# Patient Record
Sex: MALE | Race: OTHER | HISPANIC OR LATINO | Employment: UNEMPLOYED | URBAN - METROPOLITAN AREA
[De-identification: names, ages, dates, MRNs, and addresses within clinical notes are randomized per-mention and may not be internally consistent; named-entity substitution may affect disease eponyms.]

---

## 2020-11-12 ENCOUNTER — HOSPITAL ENCOUNTER (EMERGENCY)
Facility: MEDICAL CENTER | Age: 45
End: 2020-11-12
Attending: EMERGENCY MEDICINE
Payer: OTHER GOVERNMENT

## 2020-11-12 VITALS
SYSTOLIC BLOOD PRESSURE: 118 MMHG | WEIGHT: 229.72 LBS | HEART RATE: 72 BPM | OXYGEN SATURATION: 98 % | TEMPERATURE: 97.7 F | RESPIRATION RATE: 20 BRPM | DIASTOLIC BLOOD PRESSURE: 88 MMHG | BODY MASS INDEX: 36.06 KG/M2 | HEIGHT: 67 IN

## 2020-11-12 DIAGNOSIS — Z20.822 SUSPECTED 2019 NOVEL CORONAVIRUS INFECTION: ICD-10-CM

## 2020-11-12 LAB
COVID ORDER STATUS COVID19: NORMAL
SARS-COV-2 RNA RESP QL NAA+PROBE: DETECTED
SPECIMEN SOURCE: ABNORMAL

## 2020-11-12 PROCEDURE — 99283 EMERGENCY DEPT VISIT LOW MDM: CPT

## 2020-11-12 PROCEDURE — 700102 HCHG RX REV CODE 250 W/ 637 OVERRIDE(OP): Performed by: EMERGENCY MEDICINE

## 2020-11-12 PROCEDURE — A9270 NON-COVERED ITEM OR SERVICE: HCPCS | Performed by: EMERGENCY MEDICINE

## 2020-11-12 PROCEDURE — U0003 INFECTIOUS AGENT DETECTION BY NUCLEIC ACID (DNA OR RNA); SEVERE ACUTE RESPIRATORY SYNDROME CORONAVIRUS 2 (SARS-COV-2) (CORONAVIRUS DISEASE [COVID-19]), AMPLIFIED PROBE TECHNIQUE, MAKING USE OF HIGH THROUGHPUT TECHNOLOGIES AS DESCRIBED BY CMS-2020-01-R: HCPCS

## 2020-11-12 RX ORDER — ALBUTEROL SULFATE 90 UG/1
2 AEROSOL, METERED RESPIRATORY (INHALATION) EVERY 6 HOURS PRN
Qty: 8.5 G | Refills: 0 | Status: SHIPPED | OUTPATIENT
Start: 2020-11-12 | End: 2021-11-29

## 2020-11-12 RX ORDER — ACETAMINOPHEN 325 MG/1
650 TABLET ORAL ONCE
Status: COMPLETED | OUTPATIENT
Start: 2020-11-12 | End: 2020-11-12

## 2020-11-12 RX ORDER — BENZONATATE 100 MG/1
100 CAPSULE ORAL 3 TIMES DAILY PRN
Qty: 30 CAP | Refills: 0 | Status: SHIPPED | OUTPATIENT
Start: 2020-11-12 | End: 2021-11-29

## 2020-11-12 RX ORDER — IBUPROFEN 600 MG/1
600 TABLET ORAL ONCE
Status: COMPLETED | OUTPATIENT
Start: 2020-11-12 | End: 2020-11-12

## 2020-11-12 RX ADMIN — ACETAMINOPHEN 650 MG: 325 TABLET, FILM COATED ORAL at 11:24

## 2020-11-12 RX ADMIN — IBUPROFEN 600 MG: 600 TABLET, FILM COATED ORAL at 11:24

## 2020-11-12 SDOH — HEALTH STABILITY: MENTAL HEALTH: HOW OFTEN DO YOU HAVE A DRINK CONTAINING ALCOHOL?: NEVER

## 2020-11-12 NOTE — ED NOTES
Unable to complete med rec at this time.  Pt's phone number on file is to a group home [facility].

## 2020-11-12 NOTE — ED NOTES
assisting with care-med per erp, for d/c. instructions and prescriptions reviewed with pt. To use otc tylenol and/or motrin for discomfort or fever, return for worsening s/s, f/u with pcp. Aware of need to self isolate due to covid +   No

## 2020-11-12 NOTE — ED TRIAGE NOTES
Pt ambulates to triage    Chief Complaint   Patient presents with   • Body Aches   • Fatigue   • Headache     Pt A & 0 x 4, speech clear, ambulates well    COVID-19 screening criteria completed, pt denies high risk travel and denies contact with COVID-19 positive pt    Pt updated on triage process and asked to inform RN of any changes while waiting in lobby.
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
## 2020-11-12 NOTE — DISCHARGE INSTRUCTIONS
Please self isolate until the results of your coronavirus test are known  We will be working with the county to determine segregation based on symptoms and test results of your facility

## 2021-01-13 ENCOUNTER — HOSPITAL ENCOUNTER (EMERGENCY)
Facility: MEDICAL CENTER | Age: 46
End: 2021-01-13
Attending: EMERGENCY MEDICINE

## 2021-01-13 ENCOUNTER — APPOINTMENT (OUTPATIENT)
Dept: RADIOLOGY | Facility: MEDICAL CENTER | Age: 46
End: 2021-01-13
Attending: EMERGENCY MEDICINE

## 2021-01-13 VITALS
BODY MASS INDEX: 35.08 KG/M2 | WEIGHT: 231.48 LBS | HEART RATE: 93 BPM | DIASTOLIC BLOOD PRESSURE: 57 MMHG | TEMPERATURE: 96.6 F | HEIGHT: 68 IN | SYSTOLIC BLOOD PRESSURE: 110 MMHG | OXYGEN SATURATION: 94 % | RESPIRATION RATE: 45 BRPM

## 2021-01-13 DIAGNOSIS — I47.10 SVT (SUPRAVENTRICULAR TACHYCARDIA) (HCC): ICD-10-CM

## 2021-01-13 LAB
ALBUMIN SERPL BCP-MCNC: 4.1 G/DL (ref 3.2–4.9)
ALBUMIN/GLOB SERPL: 1.2 G/DL
ALP SERPL-CCNC: 100 U/L (ref 30–99)
ALT SERPL-CCNC: 36 U/L (ref 2–50)
ANION GAP SERPL CALC-SCNC: 15 MMOL/L (ref 7–16)
AST SERPL-CCNC: 31 U/L (ref 12–45)
BASOPHILS # BLD AUTO: 0.4 % (ref 0–1.8)
BASOPHILS # BLD: 0.05 K/UL (ref 0–0.12)
BILIRUB SERPL-MCNC: 0.2 MG/DL (ref 0.1–1.5)
BUN SERPL-MCNC: 23 MG/DL (ref 8–22)
CALCIUM SERPL-MCNC: 9.7 MG/DL (ref 8.4–10.2)
CHLORIDE SERPL-SCNC: 107 MMOL/L (ref 96–112)
CO2 SERPL-SCNC: 21 MMOL/L (ref 20–33)
CREAT SERPL-MCNC: 1.37 MG/DL (ref 0.5–1.4)
EKG IMPRESSION: NORMAL
EKG IMPRESSION: NORMAL
EOSINOPHIL # BLD AUTO: 0.19 K/UL (ref 0–0.51)
EOSINOPHIL NFR BLD: 1.6 % (ref 0–6.9)
ERYTHROCYTE [DISTWIDTH] IN BLOOD BY AUTOMATED COUNT: 47 FL (ref 35.9–50)
GLOBULIN SER CALC-MCNC: 3.4 G/DL (ref 1.9–3.5)
GLUCOSE SERPL-MCNC: 138 MG/DL (ref 65–99)
HCT VFR BLD AUTO: 42.1 % (ref 42–52)
HGB BLD-MCNC: 14.2 G/DL (ref 14–18)
IMM GRANULOCYTES # BLD AUTO: 0.07 K/UL (ref 0–0.11)
IMM GRANULOCYTES NFR BLD AUTO: 0.6 % (ref 0–0.9)
LYMPHOCYTES # BLD AUTO: 3.64 K/UL (ref 1–4.8)
LYMPHOCYTES NFR BLD: 30.3 % (ref 22–41)
MAGNESIUM SERPL-MCNC: 2.2 MG/DL (ref 1.5–2.5)
MCH RBC QN AUTO: 31.3 PG (ref 27–33)
MCHC RBC AUTO-ENTMCNC: 33.7 G/DL (ref 33.7–35.3)
MCV RBC AUTO: 92.7 FL (ref 81.4–97.8)
MONOCYTES # BLD AUTO: 0.58 K/UL (ref 0–0.85)
MONOCYTES NFR BLD AUTO: 4.8 % (ref 0–13.4)
NEUTROPHILS # BLD AUTO: 7.47 K/UL (ref 1.82–7.42)
NEUTROPHILS NFR BLD: 62.3 % (ref 44–72)
NRBC # BLD AUTO: 0 K/UL
NRBC BLD-RTO: 0 /100 WBC
PHOSPHATE SERPL-MCNC: 4.3 MG/DL (ref 2.5–4.5)
PLATELET # BLD AUTO: 410 K/UL (ref 164–446)
PMV BLD AUTO: 9.9 FL (ref 9–12.9)
POTASSIUM SERPL-SCNC: 3.9 MMOL/L (ref 3.6–5.5)
PROT SERPL-MCNC: 7.5 G/DL (ref 6–8.2)
RBC # BLD AUTO: 4.54 M/UL (ref 4.7–6.1)
SODIUM SERPL-SCNC: 143 MMOL/L (ref 135–145)
TSH SERPL DL<=0.005 MIU/L-ACNC: 4.86 UIU/ML (ref 0.38–5.33)
WBC # BLD AUTO: 12 K/UL (ref 4.8–10.8)

## 2021-01-13 PROCEDURE — 93005 ELECTROCARDIOGRAM TRACING: CPT | Performed by: EMERGENCY MEDICINE

## 2021-01-13 PROCEDURE — 700111 HCHG RX REV CODE 636 W/ 250 OVERRIDE (IP)

## 2021-01-13 PROCEDURE — 83735 ASSAY OF MAGNESIUM: CPT

## 2021-01-13 PROCEDURE — 84100 ASSAY OF PHOSPHORUS: CPT

## 2021-01-13 PROCEDURE — 85025 COMPLETE CBC W/AUTO DIFF WBC: CPT

## 2021-01-13 PROCEDURE — 96374 THER/PROPH/DIAG INJ IV PUSH: CPT

## 2021-01-13 PROCEDURE — 700111 HCHG RX REV CODE 636 W/ 250 OVERRIDE (IP): Performed by: EMERGENCY MEDICINE

## 2021-01-13 PROCEDURE — 99284 EMERGENCY DEPT VISIT MOD MDM: CPT

## 2021-01-13 PROCEDURE — 84443 ASSAY THYROID STIM HORMONE: CPT

## 2021-01-13 PROCEDURE — 94760 N-INVAS EAR/PLS OXIMETRY 1: CPT

## 2021-01-13 PROCEDURE — 71045 X-RAY EXAM CHEST 1 VIEW: CPT

## 2021-01-13 PROCEDURE — 80053 COMPREHEN METABOLIC PANEL: CPT

## 2021-01-13 RX ORDER — ADENOSINE 3 MG/ML
6 INJECTION, SOLUTION INTRAVENOUS ONCE
Status: COMPLETED | OUTPATIENT
Start: 2021-01-13 | End: 2021-01-13

## 2021-01-13 RX ORDER — ADENOSINE 3 MG/ML
INJECTION, SOLUTION INTRAVENOUS
Status: COMPLETED
Start: 2021-01-13 | End: 2021-01-13

## 2021-01-13 RX ORDER — ADENOSINE 3 MG/ML
12 INJECTION, SOLUTION INTRAVENOUS ONCE
Status: COMPLETED | OUTPATIENT
Start: 2021-01-13 | End: 2021-01-13

## 2021-01-13 RX ADMIN — ADENOSINE 12 MG: 3 INJECTION, SOLUTION INTRAVENOUS at 17:38

## 2021-01-13 RX ADMIN — ADENOSINE 6 MG: 3 INJECTION, SOLUTION INTRAVENOUS at 17:31

## 2021-01-14 NOTE — ED NOTES
Patient remains in sinus rhythm around 90. He denies any chest pain or sob. Discharged to home with instructions to follow up with cardiology and/or return for recurrence of SVT.
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
## 2021-01-14 NOTE — ED TRIAGE NOTES
"Chief Complaint   Patient presents with   • Shortness of Breath     started about an hour PTA, present w/ ambulation and rest.      /66   Pulse (!) 189   Temp 35.9 °C (96.6 °F) (Temporal)   Resp 20   Ht 1.727 m (5' 8\")   Wt 105 kg (231 lb 7.7 oz)   SpO2 96%   BMI 35.20 kg/m²     Pt arrived w/ above concern, pt had COVID-19 in early November, mask in place. EKG done in triage. Pt states he does have hx of anxiety but \"this feels different\"  "

## 2021-01-14 NOTE — DISCHARGE INSTRUCTIONS
Supraventricular Tachycardia, Adult  Supraventricular tachycardia (SVT) is a type of abnormal heart rhythm. It causes the heart to beat very quickly and then return to a normal speed.  A normal resting heart rate is  beats per minute. During an episode of SVT, your heart rate may be higher than 150 beats per minute. Episodes of SVT can be frightening, but they are usually not dangerous. However, if episodes happen several times per day or last longer than a few seconds, they may lead to heart failure.  What are the causes?    Usually, a normal heartbeat starts when an area called the sinoatrial node releases an electrical signal. In SVT, other areas of the heart send out electrical signals that interfere with the signal from the sinoatrial node. It is not known why some people get SVT and others do not.  What increases the risk?  You are more likely to develop this condition if you are:  · 12-30 years old.  · A woman.  The following factors may make you more likely to develop this condition:  · Stress.  · Tiredness.  · Smoking.  · Stimulant drugs, such as cocaine and methamphetamine.  · Alcohol.  · Caffeine.  · Pregnancy.  · Anxiety.  What are the signs or symptoms?  Symptoms of this condition include:  · A pounding heart.  · A feeling that the heart is skipping beats (palpitations).  · Weakness.  · Shortness of breath.  · Tightness or pain in your chest.  · Light-headedness.  · Anxiety.  · Dizziness.  · Sweating.  · Nausea.  · Fainting.  · Fatigue or tiredness.  A mild episode may not cause symptoms.  How is this diagnosed?  This condition may be diagnosed based on:  · Your symptoms.  · A physical exam.  ? If you have an episode of SVT during the exam, the health care provider may be able to diagnose SVT by listening to your heart and feeling your pulse.  · Tests. These may include:  ? An electrocardiogram (ECG). This test is done to check for problems with electrical activity in the heart.  ? A Holter  monitor or event monitor test. This test involves wearing a portable device that monitors your heart rate over time.  ? An echocardiogram. This test involves taking an image of your heart using sound waves. It is done to rule out other causes of a fast heart rate.  ? Blood tests.  How is this treated?  This condition may be treated with:  · Vagal nerve stimulation. The treatment involves stimulating your vagus nerve, which slows down the heart. It is often the first and only treatment that is needed for this condition. Work with your health care provider to find which one works best for you. Ways to do this treatment include:  ? Holding your breath and pushing, as though you are having a bowel movement.  ? Massaging an area on one side of your neck, below your jaw. Do not try this yourself. Only a health care provider should do this. If done the wrong way, it can lead to a stroke.  ? Bending forward with your head between your legs.  ? Coughing while bending forward with your head between your legs.  ? Closing your eyes and massaging your eyeballs. A health care provider should guide you through this method before you try it on your own.  · Medicines that prevent attacks.  · Medicine to stop an attack. The medicine is given through an IV at the hospital.  · A small electric shock (cardioversion) that stops an attack. Before you get the shock, you will get medicine to make you fall asleep.  · Radiofrequency ablation. In this procedure, a small, thin tube (catheter) is used to send radiofrequency energy to the area of tissue that is causing the rapid heartbeats. The energy kills the cells and helps your heart keep a normal rhythm. You may have this treatment if you have symptoms of SVT often.  If you do not have symptoms, you may not need treatment.  Follow these instructions at home:  Stress  · Avoid stressful situations when possible.  · Find healthy ways of managing stress that work for you. Some healthy ways to  manage stress include:  ? Taking part in relaxing activities, such as yoga, meditation, or being out in nature.  ? Listening to relaxing music.  ? Practicing relaxation techniques, such as deep breathing.  ? Leading a healthy lifestyle. This involves getting plenty of sleep, exercising, and eating a balanced diet.  ? Attending counseling or talk therapy with a mental health professional.  Lifestyle    · Try to get at least 7 hours of sleep each night.  · Do not use any products that contain nicotine or tobacco, such as cigarettes, e-cigarettes, and chewing tobacco. If you need help quitting, ask your health care provider.  · Be aware of how alcohol affects your condition. If alcohol:  ? Triggers episodes of SVT, do not drink alcohol.  ? Does not seem to trigger episodes, limit alcohol intake to no more than 1 drink a day for nonpregnant women and 2 drinks a day for men. Be aware of how much alcohol is in your drink. In the [place] one drink equals one 12 oz bottle of beer (355 mL), one 5 oz glass of wine (148 mL), or one 1½ oz glass of hard liquor (44 mL).  · Be aware of how caffeine affects your condition. If caffeine:  ? Triggers episodes of SVT, do not eat, drink, or use anything with caffeine in it.  ? Does not seem to trigger episodes, consume caffeine in moderation.  · Do not use stimulant drugs. If you need help quitting, talk with your health care provider.  General instructions  · Maintain a healthy weight.  · Exercise regularly. Ask your health care provider to suggest some good activities for you. Aim for one or a combination of the following:  ? 150 minutes per week of moderate exercise, such as walking or yoga.  ? 75 minutes per week of vigorous exercise, such as running or swimming.  · Perform vagus nerve stimulation as directed by your health care provider.  · Take over-the-counter and prescription medicines only as told by your health care provider.  · Keep all follow-up visits as told by your health  care provider. This is important.  Contact a health care provider if:  · You have episodes of SVT more often than before.  · Episodes of SVT last longer than before.  · Vagus nerve stimulation is no longer helping.  · You have new symptoms.  Get help right away if:  · You have chest pain.  · Your symptoms get worse.  · You have trouble breathing.  · You have an episode of SVT that lasts longer than 20 minutes.  · You faint.  These symptoms may represent a serious problem that is an emergency. Do not wait to see if the symptoms will go away. Get medical help right away. Call your local emergency services (911 in the [place] Do not drive yourself to the hospital.  Summary  · Supraventricular tachycardia (SVT) is a type of abnormal heart rhythm.  · During an episode of SVT, your heart rate may be higher than 150 beats per minute.  · Treatment depends on frequency of occurrence and symptoms experienced.  This information is not intended to replace advice given to you by your health care provider. Make sure you discuss any questions you have with your health care provider.  Document Released: 12/18/2006 Document Revised: 11/05/2019 Document Reviewed: 11/05/2019  Elsevier Patient Education © 2020 [employer]

## 2021-01-14 NOTE — ED NOTES
IV established with blood draw. Specimens to lab.  At bedside. Pt on defib pads, monitor in room, tried bearing down and coughing but no change in heart rate. Adenosine given 6mg with no change. 12mg adenosine given and patient converted to sinus rhythm, sinus tachycardia 80s - 105. Patient continues to deny any pain and states he is not sob since conversion.

## 2021-02-01 ENCOUNTER — HOSPITAL ENCOUNTER (EMERGENCY)
Facility: MEDICAL CENTER | Age: 46
End: 2021-02-01
Attending: EMERGENCY MEDICINE

## 2021-02-01 VITALS
WEIGHT: 242.95 LBS | TEMPERATURE: 98.2 F | HEIGHT: 68 IN | HEART RATE: 73 BPM | BODY MASS INDEX: 36.82 KG/M2 | SYSTOLIC BLOOD PRESSURE: 125 MMHG | DIASTOLIC BLOOD PRESSURE: 74 MMHG | OXYGEN SATURATION: 94 % | RESPIRATION RATE: 18 BRPM

## 2021-02-01 DIAGNOSIS — K04.7 PERIAPICAL ABSCESS: ICD-10-CM

## 2021-02-01 PROCEDURE — 700102 HCHG RX REV CODE 250 W/ 637 OVERRIDE(OP): Performed by: EMERGENCY MEDICINE

## 2021-02-01 PROCEDURE — A9270 NON-COVERED ITEM OR SERVICE: HCPCS | Performed by: EMERGENCY MEDICINE

## 2021-02-01 PROCEDURE — 99283 EMERGENCY DEPT VISIT LOW MDM: CPT

## 2021-02-01 PROCEDURE — A9270 NON-COVERED ITEM OR SERVICE: HCPCS

## 2021-02-01 PROCEDURE — 303977 HCHG I & D

## 2021-02-01 PROCEDURE — 700102 HCHG RX REV CODE 250 W/ 637 OVERRIDE(OP)

## 2021-02-01 RX ORDER — CLINDAMYCIN HYDROCHLORIDE 150 MG/1
300 CAPSULE ORAL ONCE
Status: COMPLETED | OUTPATIENT
Start: 2021-02-01 | End: 2021-02-01

## 2021-02-01 RX ORDER — CLINDAMYCIN HYDROCHLORIDE 300 MG/1
300 CAPSULE ORAL 3 TIMES DAILY
Qty: 30 CAP | Refills: 0 | Status: SHIPPED | OUTPATIENT
Start: 2021-02-01 | End: 2021-02-11

## 2021-02-01 RX ADMIN — CLINDAMYCIN HYDROCHLORIDE 300 MG: 150 CAPSULE ORAL at 16:50

## 2021-02-01 RX ADMIN — BENZOCAINE, BUTAMBEN, AND TETRACAINE HYDROCHLORIDE 1 SPRAY: .028; .004; .004 AEROSOL, SPRAY TOPICAL at 16:07

## 2021-02-01 ASSESSMENT — FIBROSIS 4 INDEX: FIB4 SCORE: 0.57

## 2021-02-01 NOTE — ED TRIAGE NOTES
Chief Complaint   Patient presents with   • Abscess     since yesterday he has an abscess on the roof of his mouth, he tried to pop it.   He reports now it feels like he has a hole in his mouth.

## 2021-02-02 NOTE — ED PROVIDER NOTES
"ED Provider Note    CHIEF COMPLAINT  Chief Complaint   Patient presents with   • Abscess     since yesterday he has an abscess on the roof of his mouth, he tried to pop it.       HPI  Bolivar Art is a 46 y.o. male who presents with tenderness to the roof of his mouth.  He states he noticed a suspected abscess in this region over the last couple of days.  He attempted to pop the abscess and had some blood that returned but no purulent drainage.  He has not had any associated fevers.  The patient does have some slight dental tenderness.  He has not had any facial swelling.    REVIEW OF SYSTEMS  No difficulty with breathing or swallowing.    PHYSICAL EXAM  VITAL SIGNS: /81   Pulse 87   Temp 37.2 °C (99 °F) (Temporal)   Resp 18   Ht 1.727 m (5' 8\")   Wt 110 kg (242 lb 15.2 oz)   SpO2 95%   BMI 36.94 kg/m²   In general the patient does not appear toxic    Facial exam no edema no erythema    Oral cavity the patient does have a periapical abscess to the medial aspect of the left upper molar    Neck is supple without adenopathy    PROCEDURES incision and drainage  Cetacaine spray was utilized for an anesthetic to the roof of her mouth.  I then used a #11 blade for incision and drainage and there is a very small amount of purulent drainage as well as some bleeding.  Medical cOURSE & MEDICAL DECISION MAKING  Pertinent Labs & Imaging studies reviewed. (See chart for details)  This a 46-year-old male who presents with a periapical abscess to the left upper incisor.  Incision and drainage was performed.  The patient will receive a dose of clindamycin as well as a prescription.  The patient does not have any insurance nor ability to follow-up and therefore have them rechecked at [facility] in 72 hours to make sure this is improving otherwise he may require oral surgical intervention.    FINAL IMPRESSION  1.  Left upper molar periapical abscess  2.  Incision and drainage   "     Disposition  The patient will be discharged in stable condition      Electronically signed by: Pasha Nichole M.D., 2/1/2021 4:02 PM

## 2021-02-02 NOTE — DISCHARGE INSTRUCTIONS
[facility] if [facility] are not significantly better in 72 hours and sooner if worse

## 2021-03-10 ENCOUNTER — HOSPITAL ENCOUNTER (EMERGENCY)
Facility: MEDICAL CENTER | Age: 46
End: 2021-03-10
Attending: EMERGENCY MEDICINE

## 2021-03-10 VITALS
BODY MASS INDEX: 37.89 KG/M2 | TEMPERATURE: 97 F | DIASTOLIC BLOOD PRESSURE: 96 MMHG | HEIGHT: 68 IN | WEIGHT: 250 LBS | RESPIRATION RATE: 14 BRPM | OXYGEN SATURATION: 94 % | HEART RATE: 86 BPM | SYSTOLIC BLOOD PRESSURE: 135 MMHG

## 2021-03-10 DIAGNOSIS — R21 RASH: ICD-10-CM

## 2021-03-10 PROCEDURE — 99281 EMR DPT VST MAYX REQ PHY/QHP: CPT

## 2021-03-10 RX ORDER — IBUPROFEN 200 MG
1 TABLET ORAL 2 TIMES DAILY
Qty: 3.5 G | Refills: 3 | Status: SHIPPED | OUTPATIENT
Start: 2021-03-10 | End: 2021-11-29

## 2021-03-10 RX ORDER — TRIAMCINOLONE ACETONIDE 1 MG/G
1 CREAM TOPICAL 2 TIMES DAILY
Qty: 15 G | Refills: 3 | Status: SHIPPED | OUTPATIENT
Start: 2021-03-10 | End: 2021-11-29

## 2021-03-10 ASSESSMENT — PAIN DESCRIPTION - PAIN TYPE: TYPE: ACUTE PAIN

## 2021-03-10 ASSESSMENT — FIBROSIS 4 INDEX: FIB4 SCORE: 0.57

## 2021-03-10 NOTE — ED TRIAGE NOTES
"Chief Complaint   Patient presents with   • Skin Lesion     cuts and dryness on hands x5 months       /96   Pulse 86   Temp 36.1 °C (97 °F) (Temporal)   Resp 14   Ht 1.727 m (5' 8\")   Wt 113 kg (250 lb)   SpO2 94%   BMI 38.01 kg/m²     "
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
## 2021-03-10 NOTE — ED NOTES
Patient given discharge instructions, paper prescriptions, and a work note. Patient has no further questions at this time.

## 2021-06-26 ENCOUNTER — HOSPITAL ENCOUNTER (EMERGENCY)
Facility: MEDICAL CENTER | Age: 46
End: 2021-06-26
Attending: EMERGENCY MEDICINE

## 2021-06-26 ENCOUNTER — APPOINTMENT (OUTPATIENT)
Dept: RADIOLOGY | Facility: MEDICAL CENTER | Age: 46
End: 2021-06-26
Attending: EMERGENCY MEDICINE

## 2021-06-26 VITALS
RESPIRATION RATE: 16 BRPM | DIASTOLIC BLOOD PRESSURE: 86 MMHG | OXYGEN SATURATION: 93 % | HEIGHT: 68 IN | SYSTOLIC BLOOD PRESSURE: 134 MMHG | BODY MASS INDEX: 38.06 KG/M2 | TEMPERATURE: 98.1 F | HEART RATE: 70 BPM | WEIGHT: 251.1 LBS

## 2021-06-26 DIAGNOSIS — L03.818 CELLULITIS OF OTHER SPECIFIED SITE: ICD-10-CM

## 2021-06-26 PROCEDURE — 71045 X-RAY EXAM CHEST 1 VIEW: CPT

## 2021-06-26 PROCEDURE — 99283 EMERGENCY DEPT VISIT LOW MDM: CPT

## 2021-06-26 PROCEDURE — 700102 HCHG RX REV CODE 250 W/ 637 OVERRIDE(OP): Performed by: EMERGENCY MEDICINE

## 2021-06-26 PROCEDURE — A9270 NON-COVERED ITEM OR SERVICE: HCPCS | Performed by: EMERGENCY MEDICINE

## 2021-06-26 RX ORDER — AMOXICILLIN 500 MG/1
500 CAPSULE ORAL 3 TIMES DAILY
Qty: 21 CAPSULE | Refills: 0 | Status: SHIPPED | OUTPATIENT
Start: 2021-06-26 | End: 2021-07-03

## 2021-06-26 RX ORDER — AMOXICILLIN 250 MG/1
500 CAPSULE ORAL ONCE
Status: COMPLETED | OUTPATIENT
Start: 2021-06-26 | End: 2021-06-26

## 2021-06-26 RX ORDER — SULFAMETHOXAZOLE AND TRIMETHOPRIM 800; 160 MG/1; MG/1
2 TABLET ORAL ONCE
Status: COMPLETED | OUTPATIENT
Start: 2021-06-26 | End: 2021-06-26

## 2021-06-26 RX ORDER — SULFAMETHOXAZOLE AND TRIMETHOPRIM 800; 160 MG/1; MG/1
2 TABLET ORAL 2 TIMES DAILY
Qty: 28 TABLET | Refills: 0 | Status: SHIPPED | OUTPATIENT
Start: 2021-06-26 | End: 2021-07-03

## 2021-06-26 RX ADMIN — SULFAMETHOXAZOLE AND TRIMETHOPRIM 2 TABLET: 800; 160 TABLET ORAL at 07:28

## 2021-06-26 RX ADMIN — AMOXICILLIN 500 MG: 250 CAPSULE ORAL at 07:38

## 2021-06-26 ASSESSMENT — FIBROSIS 4 INDEX: FIB4 SCORE: 0.57
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
## 2021-06-26 NOTE — DISCHARGE INSTRUCTIONS
You do have a possible/probable cellulitis in the scrotum.  This point you do not have an abscess requiring surgical intervention.  Please take the antibiotics as prescribed.  In addition, utilize a warm soaks on the area and if the lesion turns into a pimple-like structure go ahead and pop it.  Please return to the emergency department if increasing symptoms.

## 2021-06-26 NOTE — ED NOTES
Upon walking with pt to room, noted that pt appeared to be short of breath, and breathing with use of accessory muscles. Pt stated he felt mildly sob while ambulating; able to talk in full sentences. Placed pt in gown and on monitor. Noted that SAO2 was 84% on room air. Placed pt on 2L nc at this time and SAO2 94%. MD and RN notified and aware at this time.
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
## 2021-11-29 ENCOUNTER — HOSPITAL ENCOUNTER (EMERGENCY)
Facility: MEDICAL CENTER | Age: 46
End: 2021-11-29
Attending: EMERGENCY MEDICINE
Payer: MEDICAID

## 2021-11-29 ENCOUNTER — APPOINTMENT (OUTPATIENT)
Dept: RADIOLOGY | Facility: MEDICAL CENTER | Age: 46
End: 2021-11-29
Attending: EMERGENCY MEDICINE
Payer: MEDICAID

## 2021-11-29 VITALS
HEART RATE: 73 BPM | HEIGHT: 68 IN | BODY MASS INDEX: 41.57 KG/M2 | TEMPERATURE: 98.4 F | WEIGHT: 274.25 LBS | RESPIRATION RATE: 18 BRPM | OXYGEN SATURATION: 96 % | SYSTOLIC BLOOD PRESSURE: 109 MMHG | DIASTOLIC BLOOD PRESSURE: 59 MMHG

## 2021-11-29 DIAGNOSIS — R10.9 LEFT FLANK PAIN: ICD-10-CM

## 2021-11-29 DIAGNOSIS — S30.1XXA CONTUSION OF ABDOMINAL WALL, INITIAL ENCOUNTER: ICD-10-CM

## 2021-11-29 LAB
ALBUMIN SERPL BCP-MCNC: 4.3 G/DL (ref 3.2–4.9)
ALBUMIN/GLOB SERPL: 1.3 G/DL
ALP SERPL-CCNC: 97 U/L (ref 30–99)
ALT SERPL-CCNC: 50 U/L (ref 2–50)
ANION GAP SERPL CALC-SCNC: 13 MMOL/L (ref 7–16)
APPEARANCE UR: CLEAR
AST SERPL-CCNC: 29 U/L (ref 12–45)
BASOPHILS # BLD AUTO: 0.4 % (ref 0–1.8)
BASOPHILS # BLD: 0.04 K/UL (ref 0–0.12)
BILIRUB SERPL-MCNC: 0.3 MG/DL (ref 0.1–1.5)
BILIRUB UR QL STRIP.AUTO: NEGATIVE
BUN SERPL-MCNC: 18 MG/DL (ref 8–22)
CALCIUM SERPL-MCNC: 9.2 MG/DL (ref 8.4–10.2)
CHLORIDE SERPL-SCNC: 104 MMOL/L (ref 96–112)
CO2 SERPL-SCNC: 24 MMOL/L (ref 20–33)
COLOR UR: YELLOW
CREAT SERPL-MCNC: 0.99 MG/DL (ref 0.5–1.4)
EOSINOPHIL # BLD AUTO: 0.44 K/UL (ref 0–0.51)
EOSINOPHIL NFR BLD: 4.9 % (ref 0–6.9)
ERYTHROCYTE [DISTWIDTH] IN BLOOD BY AUTOMATED COUNT: 49.1 FL (ref 35.9–50)
GLOBULIN SER CALC-MCNC: 3.2 G/DL (ref 1.9–3.5)
GLUCOSE SERPL-MCNC: 100 MG/DL (ref 65–99)
GLUCOSE UR STRIP.AUTO-MCNC: NEGATIVE MG/DL
HCT VFR BLD AUTO: 45.1 % (ref 42–52)
HGB BLD-MCNC: 14.8 G/DL (ref 14–18)
IMM GRANULOCYTES # BLD AUTO: 0.04 K/UL (ref 0–0.11)
IMM GRANULOCYTES NFR BLD AUTO: 0.4 % (ref 0–0.9)
KETONES UR STRIP.AUTO-MCNC: NEGATIVE MG/DL
LEUKOCYTE ESTERASE UR QL STRIP.AUTO: NEGATIVE
LIPASE SERPL-CCNC: 38 U/L (ref 7–58)
LYMPHOCYTES # BLD AUTO: 2.79 K/UL (ref 1–4.8)
LYMPHOCYTES NFR BLD: 31 % (ref 22–41)
MCH RBC QN AUTO: 31.3 PG (ref 27–33)
MCHC RBC AUTO-ENTMCNC: 32.8 G/DL (ref 33.7–35.3)
MCV RBC AUTO: 95.3 FL (ref 81.4–97.8)
MICRO URNS: NORMAL
MONOCYTES # BLD AUTO: 0.69 K/UL (ref 0–0.85)
MONOCYTES NFR BLD AUTO: 7.7 % (ref 0–13.4)
NEUTROPHILS # BLD AUTO: 5 K/UL (ref 1.82–7.42)
NEUTROPHILS NFR BLD: 55.6 % (ref 44–72)
NITRITE UR QL STRIP.AUTO: NEGATIVE
NRBC # BLD AUTO: 0 K/UL
NRBC BLD-RTO: 0 /100 WBC
PH UR STRIP.AUTO: 6 [PH] (ref 5–8)
PLATELET # BLD AUTO: 345 K/UL (ref 164–446)
PMV BLD AUTO: 10 FL (ref 9–12.9)
POTASSIUM SERPL-SCNC: 4 MMOL/L (ref 3.6–5.5)
PROT SERPL-MCNC: 7.5 G/DL (ref 6–8.2)
PROT UR QL STRIP: NEGATIVE MG/DL
RBC # BLD AUTO: 4.73 M/UL (ref 4.7–6.1)
RBC UR QL AUTO: NEGATIVE
SODIUM SERPL-SCNC: 141 MMOL/L (ref 135–145)
SP GR UR STRIP.AUTO: 1.02
WBC # BLD AUTO: 9 K/UL (ref 4.8–10.8)

## 2021-11-29 PROCEDURE — 700111 HCHG RX REV CODE 636 W/ 250 OVERRIDE (IP): Performed by: EMERGENCY MEDICINE

## 2021-11-29 PROCEDURE — 80053 COMPREHEN METABOLIC PANEL: CPT

## 2021-11-29 PROCEDURE — 74176 CT ABD & PELVIS W/O CONTRAST: CPT

## 2021-11-29 PROCEDURE — 99284 EMERGENCY DEPT VISIT MOD MDM: CPT

## 2021-11-29 PROCEDURE — 83690 ASSAY OF LIPASE: CPT

## 2021-11-29 PROCEDURE — 96375 TX/PRO/DX INJ NEW DRUG ADDON: CPT

## 2021-11-29 PROCEDURE — 36415 COLL VENOUS BLD VENIPUNCTURE: CPT

## 2021-11-29 PROCEDURE — 85025 COMPLETE CBC W/AUTO DIFF WBC: CPT

## 2021-11-29 PROCEDURE — 96374 THER/PROPH/DIAG INJ IV PUSH: CPT

## 2021-11-29 PROCEDURE — 71045 X-RAY EXAM CHEST 1 VIEW: CPT

## 2021-11-29 PROCEDURE — 81003 URINALYSIS AUTO W/O SCOPE: CPT

## 2021-11-29 RX ORDER — ONDANSETRON 2 MG/ML
4 INJECTION INTRAMUSCULAR; INTRAVENOUS ONCE
Status: COMPLETED | OUTPATIENT
Start: 2021-11-29 | End: 2021-11-29

## 2021-11-29 RX ORDER — KETOROLAC TROMETHAMINE 30 MG/ML
15 INJECTION, SOLUTION INTRAMUSCULAR; INTRAVENOUS ONCE
Status: COMPLETED | OUTPATIENT
Start: 2021-11-29 | End: 2021-11-29

## 2021-11-29 RX ORDER — NAPROXEN 500 MG/1
500 TABLET ORAL 2 TIMES DAILY WITH MEALS
Qty: 10 TABLET | Refills: 0 | Status: SHIPPED | OUTPATIENT
Start: 2021-11-29 | End: 2021-12-04

## 2021-11-29 RX ADMIN — ONDANSETRON 4 MG: 2 INJECTION INTRAMUSCULAR; INTRAVENOUS at 20:58

## 2021-11-29 RX ADMIN — KETOROLAC TROMETHAMINE 15 MG: 30 INJECTION, SOLUTION INTRAMUSCULAR at 20:58

## 2021-11-29 ASSESSMENT — PAIN DESCRIPTION - PAIN TYPE
TYPE: ACUTE PAIN
TYPE: ACUTE PAIN

## 2021-11-29 ASSESSMENT — FIBROSIS 4 INDEX: FIB4 SCORE: 0.57
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
## 2021-11-30 NOTE — ED NOTES
This RN communicated AIDET with patient & visitor. Assessment complete. Patient updated on plan of care; awaiting MD evaluation.    Safety precautions are in place including gurney locked and in lowest position, bilateral rails up, call light within reach. Patient encouraged to use call light to notify staff for any needs.

## 2021-11-30 NOTE — ED TRIAGE NOTES
"46 yr old male to triage  Chief Complaint   Patient presents with   • Flank Pain     patient report of left flank pain after coughing and eating tonight approximately an hour ago Denies any dysuria or frequency.      /89   Pulse 87   Temp 36.8 °C (98.3 °F) (Temporal)   Resp 16   Ht 1.727 m (5' 8\")   Wt 124 kg (274 lb 4 oz)   SpO2 97%   BMI 41.70 kg/m²     Has this patient been vaccinated for COVID No   If not, would they like to be vaccinated while in the ER if eligible?  No   Would the patient like to speak with the ERP about the possibility of receiving the COVID vaccine today before making a decision? No     "

## 2021-11-30 NOTE — ED NOTES
PIV established, patient medicated for 10/10 L flank pain & nausea. Patient updated on plan of care. Awaiting CT scan.

## 2021-11-30 NOTE — ED NOTES
Discharge instructions reviewed with patient. AVS signed by patient. PIV removed. Prescription electronically sent to pharmacy of choice. Patient understands to return to ED for worsening symptoms. All questions answered at this time. Patient ambulated to exit with belongings & visitor. Patient in stable condition with no signs of distress. Patient agreeable to discharge instructions.

## 2023-05-20 ENCOUNTER — APPOINTMENT (OUTPATIENT)
Dept: RADIOLOGY | Facility: MEDICAL CENTER | Age: 48
DRG: 194 | End: 2023-05-20
Attending: EMERGENCY MEDICINE
Payer: COMMERCIAL

## 2023-05-20 ENCOUNTER — HOSPITAL ENCOUNTER (INPATIENT)
Facility: MEDICAL CENTER | Age: 48
LOS: 1 days | DRG: 194 | End: 2023-05-21
Attending: EMERGENCY MEDICINE | Admitting: HOSPITALIST
Payer: COMMERCIAL

## 2023-05-20 ENCOUNTER — APPOINTMENT (OUTPATIENT)
Dept: RADIOLOGY | Facility: MEDICAL CENTER | Age: 48
DRG: 194 | End: 2023-05-20
Attending: HOSPITALIST
Payer: COMMERCIAL

## 2023-05-20 DIAGNOSIS — S01.512A LACERATION OF TONGUE, INITIAL ENCOUNTER: ICD-10-CM

## 2023-05-20 DIAGNOSIS — R93.1 ABNORMAL ECHOCARDIOGRAM: ICD-10-CM

## 2023-05-20 DIAGNOSIS — J10.1 INFLUENZA B: ICD-10-CM

## 2023-05-20 DIAGNOSIS — E66.01 CLASS 3 SEVERE OBESITY WITH BODY MASS INDEX (BMI) OF 45.0 TO 49.9 IN ADULT, UNSPECIFIED OBESITY TYPE, UNSPECIFIED WHETHER SERIOUS COMORBIDITY PRESENT (HCC): ICD-10-CM

## 2023-05-20 PROBLEM — R09.02 HYPOXIA: Status: ACTIVE | Noted: 2023-05-20

## 2023-05-20 PROBLEM — D72.819 LEUKOPENIA: Status: ACTIVE | Noted: 2023-05-20

## 2023-05-20 LAB
ALBUMIN SERPL BCP-MCNC: 3.9 G/DL (ref 3.2–4.9)
ALBUMIN/GLOB SERPL: 1.3 G/DL
ALP SERPL-CCNC: 83 U/L (ref 30–99)
ALT SERPL-CCNC: 45 U/L (ref 2–50)
ANION GAP SERPL CALC-SCNC: 13 MMOL/L (ref 7–16)
AST SERPL-CCNC: 39 U/L (ref 12–45)
BASOPHILS # BLD AUTO: 0.5 % (ref 0–1.8)
BASOPHILS # BLD: 0.02 K/UL (ref 0–0.12)
BILIRUB SERPL-MCNC: 0.2 MG/DL (ref 0.1–1.5)
BLOOD CULTURE HOLD CXBCH: NORMAL
BLOOD CULTURE HOLD CXBCH: NORMAL
BUN SERPL-MCNC: 16 MG/DL (ref 8–22)
CALCIUM ALBUM COR SERPL-MCNC: 8.8 MG/DL (ref 8.5–10.5)
CALCIUM SERPL-MCNC: 8.7 MG/DL (ref 8.4–10.2)
CHLORIDE SERPL-SCNC: 103 MMOL/L (ref 96–112)
CO2 SERPL-SCNC: 20 MMOL/L (ref 20–33)
CREAT SERPL-MCNC: 0.99 MG/DL (ref 0.5–1.4)
EKG IMPRESSION: NORMAL
EOSINOPHIL # BLD AUTO: 0.07 K/UL (ref 0–0.51)
EOSINOPHIL NFR BLD: 1.8 % (ref 0–6.9)
ERYTHROCYTE [DISTWIDTH] IN BLOOD BY AUTOMATED COUNT: 51.2 FL (ref 35.9–50)
FLUAV RNA SPEC QL NAA+PROBE: NEGATIVE
FLUBV RNA SPEC QL NAA+PROBE: POSITIVE
GFR SERPLBLD CREATININE-BSD FMLA CKD-EPI: 94 ML/MIN/1.73 M 2
GLOBULIN SER CALC-MCNC: 3.1 G/DL (ref 1.9–3.5)
GLUCOSE SERPL-MCNC: 105 MG/DL (ref 65–99)
HCT VFR BLD AUTO: 46.9 % (ref 42–52)
HGB BLD-MCNC: 15.5 G/DL (ref 14–18)
IMM GRANULOCYTES # BLD AUTO: 0.02 K/UL (ref 0–0.11)
IMM GRANULOCYTES NFR BLD AUTO: 0.5 % (ref 0–0.9)
LACTATE SERPL-SCNC: 0.9 MMOL/L (ref 0.5–2)
LYMPHOCYTES # BLD AUTO: 1.83 K/UL (ref 1–4.8)
LYMPHOCYTES NFR BLD: 45.8 % (ref 22–41)
MCH RBC QN AUTO: 31.4 PG (ref 27–33)
MCHC RBC AUTO-ENTMCNC: 33 G/DL (ref 33.7–35.3)
MCV RBC AUTO: 94.9 FL (ref 81.4–97.8)
MONOCYTES # BLD AUTO: 0.6 K/UL (ref 0–0.85)
MONOCYTES NFR BLD AUTO: 15 % (ref 0–13.4)
NEUTROPHILS # BLD AUTO: 1.46 K/UL (ref 1.82–7.42)
NEUTROPHILS NFR BLD: 36.4 % (ref 44–72)
NRBC # BLD AUTO: 0 K/UL
NRBC BLD-RTO: 0 /100 WBC
PLATELET # BLD AUTO: 252 K/UL (ref 164–446)
PMV BLD AUTO: 9.9 FL (ref 9–12.9)
POTASSIUM SERPL-SCNC: 3.8 MMOL/L (ref 3.6–5.5)
PROT SERPL-MCNC: 7 G/DL (ref 6–8.2)
RBC # BLD AUTO: 4.94 M/UL (ref 4.7–6.1)
RSV RNA SPEC QL NAA+PROBE: NEGATIVE
SARS-COV-2 RNA RESP QL NAA+PROBE: NOTDETECTED
SODIUM SERPL-SCNC: 136 MMOL/L (ref 135–145)
SPECIMEN SOURCE: ABNORMAL
WBC # BLD AUTO: 4 K/UL (ref 4.8–10.8)

## 2023-05-20 PROCEDURE — 770001 HCHG ROOM/CARE - MED/SURG/GYN PRIV*

## 2023-05-20 PROCEDURE — C9803 HOPD COVID-19 SPEC COLLECT: HCPCS | Performed by: EMERGENCY MEDICINE

## 2023-05-20 PROCEDURE — 83605 ASSAY OF LACTIC ACID: CPT

## 2023-05-20 PROCEDURE — 99285 EMERGENCY DEPT VISIT HI MDM: CPT

## 2023-05-20 PROCEDURE — 0241U HCHG SARS-COV-2 COVID-19 NFCT DS RESP RNA 4 TRGT MIC: CPT

## 2023-05-20 PROCEDURE — 36415 COLL VENOUS BLD VENIPUNCTURE: CPT

## 2023-05-20 PROCEDURE — 71045 X-RAY EXAM CHEST 1 VIEW: CPT

## 2023-05-20 PROCEDURE — 80053 COMPREHEN METABOLIC PANEL: CPT

## 2023-05-20 PROCEDURE — 85025 COMPLETE CBC W/AUTO DIFF WBC: CPT

## 2023-05-20 PROCEDURE — 93005 ELECTROCARDIOGRAM TRACING: CPT | Performed by: EMERGENCY MEDICINE

## 2023-05-20 PROCEDURE — 99223 1ST HOSP IP/OBS HIGH 75: CPT | Performed by: HOSPITALIST

## 2023-05-20 PROCEDURE — 700105 HCHG RX REV CODE 258: Performed by: EMERGENCY MEDICINE

## 2023-05-20 RX ORDER — CHLORHEXIDINE GLUCONATE ORAL RINSE 1.2 MG/ML
15 SOLUTION DENTAL 2 TIMES DAILY
Qty: 118 ML | Refills: 0 | Status: ON HOLD | OUTPATIENT
Start: 2023-05-20 | End: 2023-09-03

## 2023-05-20 RX ORDER — PROMETHAZINE HYDROCHLORIDE 25 MG/1
12.5-25 SUPPOSITORY RECTAL EVERY 4 HOURS PRN
Status: DISCONTINUED | OUTPATIENT
Start: 2023-05-20 | End: 2023-05-21 | Stop reason: HOSPADM

## 2023-05-20 RX ORDER — BISACODYL 10 MG
10 SUPPOSITORY, RECTAL RECTAL
Status: DISCONTINUED | OUTPATIENT
Start: 2023-05-20 | End: 2023-05-21 | Stop reason: HOSPADM

## 2023-05-20 RX ORDER — ACETAMINOPHEN 325 MG/1
650 TABLET ORAL EVERY 6 HOURS PRN
Status: DISCONTINUED | OUTPATIENT
Start: 2023-05-20 | End: 2023-05-21 | Stop reason: HOSPADM

## 2023-05-20 RX ORDER — ONDANSETRON 4 MG/1
4 TABLET, ORALLY DISINTEGRATING ORAL EVERY 4 HOURS PRN
Status: DISCONTINUED | OUTPATIENT
Start: 2023-05-20 | End: 2023-05-21 | Stop reason: HOSPADM

## 2023-05-20 RX ORDER — ONDANSETRON 2 MG/ML
4 INJECTION INTRAMUSCULAR; INTRAVENOUS EVERY 4 HOURS PRN
Status: DISCONTINUED | OUTPATIENT
Start: 2023-05-20 | End: 2023-05-21 | Stop reason: HOSPADM

## 2023-05-20 RX ORDER — PROCHLORPERAZINE EDISYLATE 5 MG/ML
5-10 INJECTION INTRAMUSCULAR; INTRAVENOUS EVERY 4 HOURS PRN
Status: DISCONTINUED | OUTPATIENT
Start: 2023-05-20 | End: 2023-05-21 | Stop reason: HOSPADM

## 2023-05-20 RX ORDER — POLYETHYLENE GLYCOL 3350 17 G/17G
1 POWDER, FOR SOLUTION ORAL
Status: DISCONTINUED | OUTPATIENT
Start: 2023-05-20 | End: 2023-05-21 | Stop reason: HOSPADM

## 2023-05-20 RX ORDER — PROMETHAZINE HYDROCHLORIDE 25 MG/1
12.5-25 TABLET ORAL EVERY 4 HOURS PRN
Status: DISCONTINUED | OUTPATIENT
Start: 2023-05-20 | End: 2023-05-21 | Stop reason: HOSPADM

## 2023-05-20 RX ORDER — AMOXICILLIN 250 MG
2 CAPSULE ORAL 2 TIMES DAILY
Status: DISCONTINUED | OUTPATIENT
Start: 2023-05-20 | End: 2023-05-21 | Stop reason: HOSPADM

## 2023-05-20 RX ORDER — SODIUM CHLORIDE 9 MG/ML
1000 INJECTION, SOLUTION INTRAVENOUS ONCE
Status: COMPLETED | OUTPATIENT
Start: 2023-05-20 | End: 2023-05-20

## 2023-05-20 RX ADMIN — SODIUM CHLORIDE 1000 ML: 9 INJECTION, SOLUTION INTRAVENOUS at 19:30

## 2023-05-20 ASSESSMENT — ENCOUNTER SYMPTOMS
BLURRED VISION: 0
BRUISES/BLEEDS EASILY: 0
NAUSEA: 0
DOUBLE VISION: 0
NECK PAIN: 0
SHORTNESS OF BREATH: 0
INSOMNIA: 0
WEAKNESS: 0
DIZZINESS: 0
COUGH: 0
PALPITATIONS: 0
VOMITING: 0
DEPRESSION: 0
HEADACHES: 0
SORE THROAT: 0
MYALGIAS: 0
FEVER: 0

## 2023-05-20 ASSESSMENT — FIBROSIS 4 INDEX: FIB4 SCORE: 0.56

## 2023-05-21 ENCOUNTER — APPOINTMENT (OUTPATIENT)
Dept: CARDIOLOGY | Facility: MEDICAL CENTER | Age: 48
DRG: 194 | End: 2023-05-21
Attending: INTERNAL MEDICINE
Payer: COMMERCIAL

## 2023-05-21 VITALS
OXYGEN SATURATION: 94 % | HEIGHT: 67 IN | RESPIRATION RATE: 18 BRPM | BODY MASS INDEX: 45.99 KG/M2 | HEART RATE: 82 BPM | WEIGHT: 292.99 LBS | DIASTOLIC BLOOD PRESSURE: 73 MMHG | SYSTOLIC BLOOD PRESSURE: 119 MMHG | TEMPERATURE: 98.4 F

## 2023-05-21 PROBLEM — Z71.89 ADVANCE CARE PLANNING: Status: ACTIVE | Noted: 2023-05-21

## 2023-05-21 PROBLEM — E87.6 HYPOKALEMIA: Status: ACTIVE | Noted: 2023-05-21

## 2023-05-21 PROBLEM — E66.9 OBESITY: Status: ACTIVE | Noted: 2023-05-21

## 2023-05-21 PROBLEM — R93.1 ABNORMAL ECHOCARDIOGRAM: Status: ACTIVE | Noted: 2023-05-21

## 2023-05-21 LAB
AMPHET UR QL SCN: NEGATIVE
ANION GAP SERPL CALC-SCNC: 8 MMOL/L (ref 7–16)
BARBITURATES UR QL SCN: NEGATIVE
BENZODIAZ UR QL SCN: NEGATIVE
BUN SERPL-MCNC: 13 MG/DL (ref 8–22)
BZE UR QL SCN: NEGATIVE
CALCIUM SERPL-MCNC: 8.3 MG/DL (ref 8.4–10.2)
CANNABINOIDS UR QL SCN: NEGATIVE
CHLORIDE SERPL-SCNC: 105 MMOL/L (ref 96–112)
CO2 SERPL-SCNC: 24 MMOL/L (ref 20–33)
CREAT SERPL-MCNC: 0.88 MG/DL (ref 0.5–1.4)
ERYTHROCYTE [DISTWIDTH] IN BLOOD BY AUTOMATED COUNT: 52.8 FL (ref 35.9–50)
GFR SERPLBLD CREATININE-BSD FMLA CKD-EPI: 106 ML/MIN/1.73 M 2
GLUCOSE SERPL-MCNC: 134 MG/DL (ref 65–99)
HCT VFR BLD AUTO: 46.8 % (ref 42–52)
HGB BLD-MCNC: 15.3 G/DL (ref 14–18)
LV EJECT FRACT  99904: 60
LV EJECT FRACT MOD 2C 99903: 66.02
LV EJECT FRACT MOD 4C 99902: 69.33
LV EJECT FRACT MOD BP 99901: 68.33
MCH RBC QN AUTO: 31.6 PG (ref 27–33)
MCHC RBC AUTO-ENTMCNC: 32.7 G/DL (ref 33.7–35.3)
MCV RBC AUTO: 96.7 FL (ref 81.4–97.8)
METHADONE UR QL SCN: NEGATIVE
NT-PROBNP SERPL IA-MCNC: <5 PG/ML (ref 0–125)
OPIATES UR QL SCN: NEGATIVE
OXYCODONE UR QL SCN: NEGATIVE
PCP UR QL SCN: NEGATIVE
PLATELET # BLD AUTO: 235 K/UL (ref 164–446)
PMV BLD AUTO: 10.6 FL (ref 9–12.9)
POTASSIUM SERPL-SCNC: 3.5 MMOL/L (ref 3.6–5.5)
PROPOXYPH UR QL SCN: NEGATIVE
RBC # BLD AUTO: 4.84 M/UL (ref 4.7–6.1)
SODIUM SERPL-SCNC: 137 MMOL/L (ref 135–145)
WBC # BLD AUTO: 4.2 K/UL (ref 4.8–10.8)

## 2023-05-21 PROCEDURE — 71275 CT ANGIOGRAPHY CHEST: CPT

## 2023-05-21 PROCEDURE — 85027 COMPLETE CBC AUTOMATED: CPT

## 2023-05-21 PROCEDURE — A9270 NON-COVERED ITEM OR SERVICE: HCPCS | Performed by: INTERNAL MEDICINE

## 2023-05-21 PROCEDURE — A9270 NON-COVERED ITEM OR SERVICE: HCPCS | Performed by: HOSPITALIST

## 2023-05-21 PROCEDURE — 36415 COLL VENOUS BLD VENIPUNCTURE: CPT

## 2023-05-21 PROCEDURE — 700117 HCHG RX CONTRAST REV CODE 255: Performed by: HOSPITALIST

## 2023-05-21 PROCEDURE — 700102 HCHG RX REV CODE 250 W/ 637 OVERRIDE(OP): Performed by: INTERNAL MEDICINE

## 2023-05-21 PROCEDURE — 99239 HOSP IP/OBS DSCHRG MGMT >30: CPT | Performed by: INTERNAL MEDICINE

## 2023-05-21 PROCEDURE — 80307 DRUG TEST PRSMV CHEM ANLYZR: CPT

## 2023-05-21 PROCEDURE — 80048 BASIC METABOLIC PNL TOTAL CA: CPT

## 2023-05-21 PROCEDURE — 83880 ASSAY OF NATRIURETIC PEPTIDE: CPT

## 2023-05-21 PROCEDURE — 94760 N-INVAS EAR/PLS OXIMETRY 1: CPT

## 2023-05-21 PROCEDURE — 700102 HCHG RX REV CODE 250 W/ 637 OVERRIDE(OP): Performed by: HOSPITALIST

## 2023-05-21 PROCEDURE — 93306 TTE W/DOPPLER COMPLETE: CPT

## 2023-05-21 PROCEDURE — 93306 TTE W/DOPPLER COMPLETE: CPT | Mod: 26 | Performed by: INTERNAL MEDICINE

## 2023-05-21 RX ORDER — ACETAMINOPHEN 325 MG/1
650 TABLET ORAL EVERY 6 HOURS PRN
Qty: 30 TABLET | Refills: 0 | Status: SHIPPED | OUTPATIENT
Start: 2023-05-21 | End: 2023-05-26

## 2023-05-21 RX ORDER — POTASSIUM CHLORIDE 20 MEQ/1
20 TABLET, EXTENDED RELEASE ORAL ONCE
Status: COMPLETED | OUTPATIENT
Start: 2023-05-21 | End: 2023-05-21

## 2023-05-21 RX ADMIN — POTASSIUM CHLORIDE 20 MEQ: 1500 TABLET, EXTENDED RELEASE ORAL at 07:32

## 2023-05-21 RX ADMIN — IOHEXOL 65 ML: 350 INJECTION, SOLUTION INTRAVENOUS at 00:08

## 2023-05-21 RX ADMIN — ACETAMINOPHEN 650 MG: 325 TABLET ORAL at 07:40

## 2023-05-21 RX ADMIN — ACETAMINOPHEN 650 MG: 325 TABLET ORAL at 00:16

## 2023-05-21 ASSESSMENT — LIFESTYLE VARIABLES
HOW MANY TIMES IN THE PAST YEAR HAVE YOU HAD 5 OR MORE DRINKS IN A DAY: 0
HAVE PEOPLE ANNOYED YOU BY CRITICIZING YOUR DRINKING: NO
EVER HAD A DRINK FIRST THING IN THE MORNING TO STEADY YOUR NERVES TO GET RID OF A HANGOVER: NO
CONSUMPTION TOTAL: NEGATIVE
TOTAL SCORE: 0
TOTAL SCORE: 0
AVERAGE NUMBER OF DAYS PER WEEK YOU HAVE A DRINK CONTAINING ALCOHOL: 0
HAVE YOU EVER FELT YOU SHOULD CUT DOWN ON YOUR DRINKING: NO
EVER FELT BAD OR GUILTY ABOUT YOUR DRINKING: NO
ON A TYPICAL DAY WHEN YOU DRINK ALCOHOL HOW MANY DRINKS DO YOU HAVE: 0
TOTAL SCORE: 0
ALCOHOL_USE: NO

## 2023-05-21 ASSESSMENT — COGNITIVE AND FUNCTIONAL STATUS - GENERAL
MOBILITY SCORE: 24
SUGGESTED CMS G CODE MODIFIER MOBILITY: CH
DAILY ACTIVITIY SCORE: 24
SUGGESTED CMS G CODE MODIFIER DAILY ACTIVITY: CH

## 2023-05-21 ASSESSMENT — PATIENT HEALTH QUESTIONNAIRE - PHQ9
SUM OF ALL RESPONSES TO PHQ9 QUESTIONS 1 AND 2: 0
SUM OF ALL RESPONSES TO PHQ9 QUESTIONS 1 AND 2: 0
2. FEELING DOWN, DEPRESSED, IRRITABLE, OR HOPELESS: NOT AT ALL
1. LITTLE INTEREST OR PLEASURE IN DOING THINGS: NOT AT ALL
2. FEELING DOWN, DEPRESSED, IRRITABLE, OR HOPELESS: NOT AT ALL
1. LITTLE INTEREST OR PLEASURE IN DOING THINGS: NOT AT ALL

## 2023-05-21 ASSESSMENT — PAIN DESCRIPTION - PAIN TYPE
TYPE: ACUTE PAIN
TYPE: ACUTE PAIN

## 2023-05-21 ASSESSMENT — FIBROSIS 4 INDEX: FIB4 SCORE: 1.08

## 2023-05-21 NOTE — CARE PLAN
Problem: Pain - Standard  Goal: Alleviation of pain or a reduction in pain to the patient’s comfort goal  Outcome: Progressing     Problem: Respiratory  Goal: Patient will achieve/maintain optimum respiratory ventilation and gas exchange  Outcome: Progressing   The patient is Stable - Low risk of patient condition declining or worsening    Shift Goals  Clinical Goals: pt able to wean off oxygen with SpO2 of >=90%  Patient Goals: rest comfortably  Family Goals: n/a    Progress made toward(s) clinical / shift goals:  pt able to wean off oxygen. SpO2 90%     Patient is not progressing towards the following goals:n/a

## 2023-05-21 NOTE — ED NOTES
Report received from Becky Pena RN stated pt O2 sat was at 87% RA, pt was placed on 2 L nasal cannula with approval from ERP by Becky MURILLO.  Pt O2 sat increased to 95% on 2 L per Becky MURILLO.

## 2023-05-21 NOTE — DISCHARGE INSTRUCTIONS
You were seen in the ER with a tongue laceration.  Typically tongue lacerations will heal on their own but it is possible that yours will require surgery and I gave you the contact information for Dr. Addison above.  You can call his office to schedule follow-up appointment if you so choose.  We did test you for flu today, you can get the results on your [email or web address] account.  If you do not have a MyChart account there are directions on your discharge papers and you will be notified when you have a new result.  I did offer to check labs and chest x-ray today but you have declined.  If your symptoms do not improve you should return to the ER otherwise follow-up with your primary care physician and return with any new or worsening symptoms.  I hope you feel better soon!

## 2023-05-21 NOTE — ED NOTES
Pt assumed care of pt.  Pt ambulated to room 4 from room 13 on 2L with no complications. Pt aware of admit plan. PT provided with urinal. No other needs or questions at this time.
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
## 2023-05-21 NOTE — PROGRESS NOTES
Received patient from ED via [facility]. Assumed pt care. Pt is A&Ox4. Patient able to ambulate from gurMichigamme to bed SBA. Pt on 2 LPM O2 via NC. No signs of SOB/respiratory distress. VSS. Fall precautions in place. Bed at lowest position. Call light and personal belongings within reach. Continue to monitor.

## 2023-05-21 NOTE — DISCHARGE PLANNING
Case Management Discharge Planning    Admission Date: 5/20/2023  GMLOS: 5.2  ALOS: 1    6-Clicks ADL Score: 24  6-Clicks Mobility Score: 24    Anticipated Discharge Dispo:  Discharged to home/self care (01)    DME Needed: No    Action(s) Taken: Updated Provider/Nurse on Discharge Plan and DC Assessment Complete (See below)  SW called patient to complete assessment. Patient lives in a men's group home, no DME use, patient used to have home O2 previously through [facility] however today's walk test shows patient does not need O2. Patient report once his group home director/team is out of [place] they can help provide transportation.     Escalations Completed: None    Medically Clear: Yes    Next Steps: Transportation home via group home    Barriers to Discharge: None    Is the patient up for discharge tomorrow: No      Care Transition Team Assessment    Information Source  Orientation Level: Oriented X4  Information Given By: Patient    Readmission Evaluation  Is this a readmission?: No    Elopement Risk  Legal Hold: No  Ambulatory or Self Mobile in Wheelchair: No-Not an Elopement Risk  Elopement Risk: Not at Risk for Elopement    Interdisciplinary Discharge Planning  Lives with - Patient's Self Care Capacity: Other (Comments) ([facility])  Patient or legal guardian wants to designate a caregiver: No  Support Systems: [employer]  Housing / Facility: 2 Story House  Able to Return to Previous ADL's: Yes  Prior Services: Home-Independent  Durable Medical Equipment: Not Applicable    Discharge Preparedness  What is your plan after discharge?: Home with help, Group home  Prior Functional Level: Ambulatory    Functional Assesment  Prior Functional Level: Ambulatory    Vision / Hearing Impairment  Vision Impairment : Yes  Right Eye Vision: Wears Glasses  Left Eye Vision: Wears Glasses  Hearing Impairment : No    Domestic Abuse  Have you ever been the victim of abuse or violence?: No  Physical Abuse or Sexual  Abuse: No  Verbal Abuse or Emotional Abuse: No  Possible Abuse/Neglect Reported to:: Not Applicable    Anticipated Discharge Information  Discharge Disposition: Discharged to home/self care (01)  Discharge Address: [facility] [address], [place] [address]

## 2023-05-21 NOTE — PROGRESS NOTES
ISOLATION PRECAUTIONS EDUCATION    Educated PATIENT, FAMILY, S.O: patient on isolation for INFLUENZA.    Educated on reason for isolation, how the infection may be transmitted, and how to help prevent transmission to others. Educated precautions involves staff and visitors wearing PPE, following Standard Precautions and performing meticulous hand hygiene in order to prevent transmission of infection.     Contact Precautions: Educated that Contact Precautions involves staff and visitors wearing gowns and gloves when in the patient room.     In addition, educated that the patient may leave the room, but prior to exiting the patient room each time, the patient needs to have on a fresh patient gown, ensure the potentially infectious area is covered, and perform hand hygiene with soap and water or alcohol-based hand rub, immediately prior to exiting the room.    Droplet Precautions: Educated that Droplet Precautions involves staff and visitors wearing PPE to include a surgical mask when in the patient room.     In addition, educated that they may leave their room, but prior to exiting the patient room each time, the patient needs to have on a fresh patient gown, a surgical mask must be worn by the patient while out of the patient room, and perform hand hygiene immediately prior to exiting the room.     Patient transport and mobilization on unit  Educated that they may leave their room, but prior to exiting, the patient needs to have on a fresh patient gown, ensure the potentially infectious area is covered, performing appropriate hand hygiene immediately prior to exiting the room.

## 2023-05-21 NOTE — PROGRESS NOTES
Received report from night nurse. Assumed care at 0730. Pt is awake, alert and oriented. Pt denies pain at this time. No apparent sign of distress/SOB. No numbness or tingling sensation. Pt is currently on 2L oxygen via nasal cannula SpO2 of 96%.  Pt updated on plan of care for the day. All questions answered. No other needs at this time. Call light and belongings within reach, bed locked and in lowest position. Pt educated to call for assistance.     0830 home O2 evaluation done. Patient at rest on room air SpO2 of 93%, with oxygen at rest SpO2 96%. Ambulation with oxygen at 2L, Spo2 92% and ambulation without oxygen, Spo2 90%.   Call light and belongings within reach, bed locked and in lowest position. Pt educated to call for assistance. Hourly rounding in place.   0928 left a voicemail at [phone] for follow up appointment as outpatient.   1015 left a voicemail for patient follow up appointment .

## 2023-05-21 NOTE — ED TRIAGE NOTES
"Chief Complaint   Patient presents with    Facial Laceration     49 yo male ambulates to triage with reports of bit his tongue in his sleep a couple of days ago.  Patient reports he noticed a fever 2 days ago after biting tongue.      /69   Pulse 92   Temp 37.5 °C (99.5 °F) (Temporal)   Resp 20   Ht 1.702 m (5' 7\")   Wt (!) 133 kg (292 lb 15.9 oz)   SpO2 92%   BMI 45.89 kg/m²    "
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
## 2023-05-21 NOTE — ED NOTES
"Pt 87% on room air upon discharge vitals. Pt states  \" I don't feel good\" complains of body aches and loss of taste. ERP updated. Awaiting new orders.   "

## 2023-05-21 NOTE — CARE PLAN
The patient is Stable - Low risk of patient condition declining or worsening    Shift Goals  Clinical Goals: Pain control on a scale of 2/10 or lower  Patient Goals: Sleep comfortably tonight    Progress made toward(s) clinical / shift goals:  Pain medications given as ordered, hourly rounding done.    Patient is not progressing towards the following goals: n/a
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
## 2023-05-21 NOTE — PROGRESS NOTES
Med rec updated and complete, per pt   Allergies reviewed, per pt  Pt reports no prescription medications in the last 30 days or longer.  Pt reports no antibiotics in the last 30 days.

## 2023-05-21 NOTE — PROGRESS NOTES
Pt discharged home in good and stable condition. Reviewed all discharge instructions and answered any questions. IV discontinued. Escorted to ER, waits for his ride.

## 2023-05-22 ENCOUNTER — TELEPHONE (OUTPATIENT)
Dept: CARDIOLOGY | Facility: MEDICAL CENTER | Age: 48
End: 2023-05-22
Payer: COMMERCIAL

## 2023-05-22 NOTE — TELEPHONE ENCOUNTER
Called and LVM for pt regarding NP appt with  5/24/23. Unable to reach pt to check if he was treated with an outside cardiology or testing completed. Pt is to call us back and also confirm appt.

## 2023-06-07 ENCOUNTER — OFFICE VISIT (OUTPATIENT)
Dept: CARDIOLOGY | Facility: MEDICAL CENTER | Age: 48
End: 2023-06-07
Payer: COMMERCIAL

## 2023-06-07 VITALS
WEIGHT: 288 LBS | HEIGHT: 67 IN | OXYGEN SATURATION: 95 % | SYSTOLIC BLOOD PRESSURE: 122 MMHG | BODY MASS INDEX: 45.2 KG/M2 | DIASTOLIC BLOOD PRESSURE: 70 MMHG | HEART RATE: 70 BPM | RESPIRATION RATE: 20 BRPM

## 2023-06-07 DIAGNOSIS — R06.02 SHORTNESS OF BREATH: ICD-10-CM

## 2023-06-07 DIAGNOSIS — R53.83 OTHER FATIGUE: ICD-10-CM

## 2023-06-07 DIAGNOSIS — E66.01 CLASS 3 SEVERE OBESITY WITH BODY MASS INDEX (BMI) OF 45.0 TO 49.9 IN ADULT, UNSPECIFIED OBESITY TYPE, UNSPECIFIED WHETHER SERIOUS COMORBIDITY PRESENT (HCC): ICD-10-CM

## 2023-06-07 DIAGNOSIS — R93.1 ABNORMAL ECHOCARDIOGRAM: ICD-10-CM

## 2023-06-07 PROBLEM — Z71.89 ADVANCE CARE PLANNING: Status: RESOLVED | Noted: 2023-05-21 | Resolved: 2023-06-07

## 2023-06-07 PROCEDURE — 99204 OFFICE O/P NEW MOD 45 MIN: CPT

## 2023-06-07 PROCEDURE — 3074F SYST BP LT 130 MM HG: CPT

## 2023-06-07 PROCEDURE — 3078F DIAST BP <80 MM HG: CPT

## 2023-06-07 PROCEDURE — 99211 OFF/OP EST MAY X REQ PHY/QHP: CPT

## 2023-06-07 ASSESSMENT — ENCOUNTER SYMPTOMS
MUSCULOSKELETAL NEGATIVE: 1
GASTROINTESTINAL NEGATIVE: 1
NEUROLOGICAL NEGATIVE: 1
NERVOUS/ANXIOUS: 0
ORTHOPNEA: 0
PND: 0
DEPRESSION: 0
PALPITATIONS: 0
EYES NEGATIVE: 1
SHORTNESS OF BREATH: 1

## 2023-06-07 ASSESSMENT — FIBROSIS 4 INDEX: FIB4 SCORE: 1.162755348299890642
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
## 2023-06-07 NOTE — PATIENT INSTRUCTIONS
Follow up with PCP regarding management of obesity and follow up with pulmonary and sleep medicine for suspected sleep apnea    Discussed in extensive detail regarding lifestyle modifications, focusing on dietary changes.     Discussed following recommendations with the patient to improve dietary choices:  1.  Increase amount of whole foods and grains (fruits/vegetables from the produce section without processing).  2.  Pay special attention to the nutrition facts with goal of decreasing saturated fat, foods with high cholesterol and high overall fat content.  3.  Reducing portion size with healthy snack options  4.  Avoid processed/packaged/frozen meals with more than 5 ingredients on the label, or multiple ingredients you don't recognize.  5.  Avoiding red meat and replacing with fresh fruits, vegetables and lean meat.  6.  Minimize sugar.  This means any product which has added sugar in the label (soda, candy, and many processed foods).     Discussed ACC/AHA guidelines of cardio focused exercise for a minimum of 150 minutes/week to maintain healthy weight.

## 2023-09-02 ENCOUNTER — APPOINTMENT (OUTPATIENT)
Dept: RADIOLOGY | Facility: MEDICAL CENTER | Age: 48
DRG: 177 | End: 2023-09-02
Attending: STUDENT IN AN ORGANIZED HEALTH CARE EDUCATION/TRAINING PROGRAM
Payer: COMMERCIAL

## 2023-09-02 ENCOUNTER — HOSPITAL ENCOUNTER (INPATIENT)
Facility: MEDICAL CENTER | Age: 48
LOS: 1 days | DRG: 177 | End: 2023-09-04
Attending: STUDENT IN AN ORGANIZED HEALTH CARE EDUCATION/TRAINING PROGRAM | Admitting: INTERNAL MEDICINE
Payer: COMMERCIAL

## 2023-09-02 DIAGNOSIS — J96.01 ACUTE RESPIRATORY FAILURE WITH HYPOXIA (HCC): ICD-10-CM

## 2023-09-02 DIAGNOSIS — U07.1 COVID-19 VIRUS INFECTION: ICD-10-CM

## 2023-09-02 DIAGNOSIS — R09.02 HYPOXIA: ICD-10-CM

## 2023-09-02 DIAGNOSIS — R73.9 HYPERGLYCEMIA: ICD-10-CM

## 2023-09-02 DIAGNOSIS — U07.1 COVID-19: ICD-10-CM

## 2023-09-02 DIAGNOSIS — J12.9 VIRAL PNEUMONITIS: ICD-10-CM

## 2023-09-02 LAB
ALBUMIN SERPL BCP-MCNC: 4 G/DL (ref 3.2–4.9)
ALBUMIN/GLOB SERPL: 1.3 G/DL
ALP SERPL-CCNC: 84 U/L (ref 30–99)
ALT SERPL-CCNC: <5 U/L (ref 2–50)
ANION GAP SERPL CALC-SCNC: 12 MMOL/L (ref 7–16)
AST SERPL-CCNC: 30 U/L (ref 12–45)
BASOPHILS # BLD AUTO: 0.7 % (ref 0–1.8)
BASOPHILS # BLD: 0.04 K/UL (ref 0–0.12)
BILIRUB SERPL-MCNC: 0.3 MG/DL (ref 0.1–1.5)
BUN SERPL-MCNC: 13 MG/DL (ref 8–22)
CALCIUM ALBUM COR SERPL-MCNC: 8.5 MG/DL (ref 8.5–10.5)
CALCIUM SERPL-MCNC: 8.5 MG/DL (ref 8.4–10.2)
CHLORIDE SERPL-SCNC: 104 MMOL/L (ref 96–112)
CO2 SERPL-SCNC: 23 MMOL/L (ref 20–33)
CREAT SERPL-MCNC: 0.84 MG/DL (ref 0.5–1.4)
EKG IMPRESSION: NORMAL
EOSINOPHIL # BLD AUTO: 0.16 K/UL (ref 0–0.51)
EOSINOPHIL NFR BLD: 2.7 % (ref 0–6.9)
ERYTHROCYTE [DISTWIDTH] IN BLOOD BY AUTOMATED COUNT: 51.1 FL (ref 35.9–50)
GFR SERPLBLD CREATININE-BSD FMLA CKD-EPI: 108 ML/MIN/1.73 M 2
GLOBULIN SER CALC-MCNC: 3.2 G/DL (ref 1.9–3.5)
GLUCOSE SERPL-MCNC: 126 MG/DL (ref 65–99)
HCT VFR BLD AUTO: 50.4 % (ref 42–52)
HGB BLD-MCNC: 16.2 G/DL (ref 14–18)
IMM GRANULOCYTES # BLD AUTO: 0.04 K/UL (ref 0–0.11)
IMM GRANULOCYTES NFR BLD AUTO: 0.7 % (ref 0–0.9)
LYMPHOCYTES # BLD AUTO: 1.42 K/UL (ref 1–4.8)
LYMPHOCYTES NFR BLD: 24.4 % (ref 22–41)
MCH RBC QN AUTO: 31.6 PG (ref 27–33)
MCHC RBC AUTO-ENTMCNC: 32.1 G/DL (ref 32.3–36.5)
MCV RBC AUTO: 98.4 FL (ref 81.4–97.8)
MONOCYTES # BLD AUTO: 0.68 K/UL (ref 0–0.85)
MONOCYTES NFR BLD AUTO: 11.7 % (ref 0–13.4)
NEUTROPHILS # BLD AUTO: 3.48 K/UL (ref 1.82–7.42)
NEUTROPHILS NFR BLD: 59.8 % (ref 44–72)
NRBC # BLD AUTO: 0 K/UL
NRBC BLD-RTO: 0 /100 WBC (ref 0–0.2)
PLATELET # BLD AUTO: 231 K/UL (ref 164–446)
PMV BLD AUTO: 10.7 FL (ref 9–12.9)
POTASSIUM SERPL-SCNC: 4 MMOL/L (ref 3.6–5.5)
PROT SERPL-MCNC: 7.2 G/DL (ref 6–8.2)
RBC # BLD AUTO: 5.12 M/UL (ref 4.7–6.1)
SODIUM SERPL-SCNC: 139 MMOL/L (ref 135–145)
WBC # BLD AUTO: 5.8 K/UL (ref 4.8–10.8)

## 2023-09-02 PROCEDURE — 36415 COLL VENOUS BLD VENIPUNCTURE: CPT

## 2023-09-02 PROCEDURE — 0241U HCHG SARS-COV-2 COVID-19 NFCT DS RESP RNA 4 TRGT MIC: CPT

## 2023-09-02 PROCEDURE — 84145 PROCALCITONIN (PCT): CPT

## 2023-09-02 PROCEDURE — 85379 FIBRIN DEGRADATION QUANT: CPT

## 2023-09-02 PROCEDURE — C9803 HOPD COVID-19 SPEC COLLECT: HCPCS | Performed by: STUDENT IN AN ORGANIZED HEALTH CARE EDUCATION/TRAINING PROGRAM

## 2023-09-02 PROCEDURE — 85025 COMPLETE CBC W/AUTO DIFF WBC: CPT

## 2023-09-02 PROCEDURE — 94760 N-INVAS EAR/PLS OXIMETRY 1: CPT

## 2023-09-02 PROCEDURE — 80053 COMPREHEN METABOLIC PANEL: CPT

## 2023-09-02 PROCEDURE — 99285 EMERGENCY DEPT VISIT HI MDM: CPT

## 2023-09-02 PROCEDURE — 83036 HEMOGLOBIN GLYCOSYLATED A1C: CPT

## 2023-09-02 PROCEDURE — 86140 C-REACTIVE PROTEIN: CPT

## 2023-09-02 PROCEDURE — 93005 ELECTROCARDIOGRAM TRACING: CPT | Performed by: STUDENT IN AN ORGANIZED HEALTH CARE EDUCATION/TRAINING PROGRAM

## 2023-09-02 PROCEDURE — 71045 X-RAY EXAM CHEST 1 VIEW: CPT

## 2023-09-02 ASSESSMENT — FIBROSIS 4 INDEX: FIB4 SCORE: 1.162755348299890642

## 2023-09-03 PROBLEM — U07.1 COVID-19 VIRUS INFECTION: Status: ACTIVE | Noted: 2023-09-03

## 2023-09-03 PROBLEM — R73.9 HYPERGLYCEMIA: Status: ACTIVE | Noted: 2023-09-03

## 2023-09-03 PROBLEM — J96.01 ACUTE RESPIRATORY FAILURE WITH HYPOXIA (HCC): Status: ACTIVE | Noted: 2023-09-03

## 2023-09-03 LAB
CRP SERPL HS-MCNC: 0.8 MG/DL (ref 0–0.75)
D DIMER PPP IA.FEU-MCNC: 0.53 UG/ML (FEU) (ref 0–0.5)
EST. AVERAGE GLUCOSE BLD GHB EST-MCNC: 157 MG/DL
FLUAV RNA SPEC QL NAA+PROBE: NEGATIVE
FLUBV RNA SPEC QL NAA+PROBE: NEGATIVE
GLUCOSE BLD STRIP.AUTO-MCNC: 248 MG/DL (ref 65–99)
GLUCOSE BLD STRIP.AUTO-MCNC: 275 MG/DL (ref 65–99)
GLUCOSE BLD STRIP.AUTO-MCNC: 298 MG/DL (ref 65–99)
HBA1C MFR BLD: 7.1 % (ref 4–5.6)
PROCALCITONIN SERPL-MCNC: 0.06 NG/ML
RSV RNA SPEC QL NAA+PROBE: NEGATIVE
SARS-COV-2 RNA RESP QL NAA+PROBE: DETECTED
SPECIMEN SOURCE: ABNORMAL

## 2023-09-03 PROCEDURE — 700102 HCHG RX REV CODE 250 W/ 637 OVERRIDE(OP): Performed by: INTERNAL MEDICINE

## 2023-09-03 PROCEDURE — 94760 N-INVAS EAR/PLS OXIMETRY 1: CPT

## 2023-09-03 PROCEDURE — 96374 THER/PROPH/DIAG INJ IV PUSH: CPT

## 2023-09-03 PROCEDURE — 36415 COLL VENOUS BLD VENIPUNCTURE: CPT

## 2023-09-03 PROCEDURE — 770001 HCHG ROOM/CARE - MED/SURG/GYN PRIV*

## 2023-09-03 PROCEDURE — 99223 1ST HOSP IP/OBS HIGH 75: CPT | Performed by: INTERNAL MEDICINE

## 2023-09-03 PROCEDURE — A9270 NON-COVERED ITEM OR SERVICE: HCPCS | Performed by: INTERNAL MEDICINE

## 2023-09-03 PROCEDURE — 82962 GLUCOSE BLOOD TEST: CPT | Mod: 91

## 2023-09-03 PROCEDURE — 700111 HCHG RX REV CODE 636 W/ 250 OVERRIDE (IP): Performed by: STUDENT IN AN ORGANIZED HEALTH CARE EDUCATION/TRAINING PROGRAM

## 2023-09-03 RX ORDER — AMOXICILLIN 250 MG
2 CAPSULE ORAL 2 TIMES DAILY
Status: DISCONTINUED | OUTPATIENT
Start: 2023-09-03 | End: 2023-09-04 | Stop reason: HOSPADM

## 2023-09-03 RX ORDER — BUDESONIDE AND FORMOTEROL FUMARATE DIHYDRATE 160; 4.5 UG/1; UG/1
1 AEROSOL RESPIRATORY (INHALATION) 2 TIMES DAILY
COMMUNITY

## 2023-09-03 RX ORDER — DEXAMETHASONE SODIUM PHOSPHATE 4 MG/ML
6 INJECTION, SOLUTION INTRA-ARTICULAR; INTRALESIONAL; INTRAMUSCULAR; INTRAVENOUS; SOFT TISSUE ONCE
Status: COMPLETED | OUTPATIENT
Start: 2023-09-03 | End: 2023-09-03

## 2023-09-03 RX ORDER — DEXTROSE MONOHYDRATE 25 G/50ML
25 INJECTION, SOLUTION INTRAVENOUS
Status: DISCONTINUED | OUTPATIENT
Start: 2023-09-03 | End: 2023-09-04 | Stop reason: HOSPADM

## 2023-09-03 RX ORDER — ACETAMINOPHEN 325 MG/1
650 TABLET ORAL EVERY 6 HOURS PRN
Status: DISCONTINUED | OUTPATIENT
Start: 2023-09-03 | End: 2023-09-04 | Stop reason: HOSPADM

## 2023-09-03 RX ORDER — GUAIFENESIN/DEXTROMETHORPHAN 100-10MG/5
10 SYRUP ORAL EVERY 6 HOURS PRN
Status: DISCONTINUED | OUTPATIENT
Start: 2023-09-03 | End: 2023-09-04 | Stop reason: HOSPADM

## 2023-09-03 RX ORDER — POLYETHYLENE GLYCOL 3350 17 G/17G
1 POWDER, FOR SOLUTION ORAL
Status: DISCONTINUED | OUTPATIENT
Start: 2023-09-03 | End: 2023-09-04 | Stop reason: HOSPADM

## 2023-09-03 RX ORDER — DEXAMETHASONE 4 MG/1
6 TABLET ORAL DAILY
Status: DISCONTINUED | OUTPATIENT
Start: 2023-09-03 | End: 2023-09-04 | Stop reason: HOSPADM

## 2023-09-03 RX ORDER — LABETALOL HYDROCHLORIDE 5 MG/ML
10 INJECTION, SOLUTION INTRAVENOUS EVERY 4 HOURS PRN
Status: DISCONTINUED | OUTPATIENT
Start: 2023-09-03 | End: 2023-09-04 | Stop reason: HOSPADM

## 2023-09-03 RX ORDER — ALBUTEROL SULFATE 90 UG/1
2 AEROSOL, METERED RESPIRATORY (INHALATION) EVERY 4 HOURS PRN
Status: DISCONTINUED | OUTPATIENT
Start: 2023-09-03 | End: 2023-09-04 | Stop reason: HOSPADM

## 2023-09-03 RX ORDER — BISACODYL 10 MG
10 SUPPOSITORY, RECTAL RECTAL
Status: DISCONTINUED | OUTPATIENT
Start: 2023-09-03 | End: 2023-09-04 | Stop reason: HOSPADM

## 2023-09-03 RX ADMIN — INSULIN HUMAN 3 UNITS: 100 INJECTION, SOLUTION PARENTERAL at 11:33

## 2023-09-03 RX ADMIN — INSULIN HUMAN 3 UNITS: 100 INJECTION, SOLUTION PARENTERAL at 21:48

## 2023-09-03 RX ADMIN — DEXAMETHASONE SODIUM PHOSPHATE 6 MG: 4 INJECTION INTRA-ARTICULAR; INTRALESIONAL; INTRAMUSCULAR; INTRAVENOUS; SOFT TISSUE at 00:30

## 2023-09-03 RX ADMIN — RIVAROXABAN 10 MG: 10 TABLET, FILM COATED ORAL at 01:09

## 2023-09-03 RX ADMIN — INSULIN HUMAN 2 UNITS: 100 INJECTION, SOLUTION PARENTERAL at 16:53

## 2023-09-03 RX ADMIN — ACETAMINOPHEN 650 MG: 325 TABLET ORAL at 10:11

## 2023-09-03 RX ADMIN — RIVAROXABAN 10 MG: 10 TABLET, FILM COATED ORAL at 17:01

## 2023-09-03 RX ADMIN — DEXAMETHASONE 6 MG: 4 TABLET ORAL at 10:00

## 2023-09-03 ASSESSMENT — COGNITIVE AND FUNCTIONAL STATUS - GENERAL
MOBILITY SCORE: 24
SUGGESTED CMS G CODE MODIFIER DAILY ACTIVITY: CH
DAILY ACTIVITIY SCORE: 24
SUGGESTED CMS G CODE MODIFIER MOBILITY: CH

## 2023-09-03 ASSESSMENT — ENCOUNTER SYMPTOMS
SHORTNESS OF BREATH: 1
DIZZINESS: 0
STRIDOR: 0
CONSTIPATION: 0
DIARRHEA: 0
NAUSEA: 0
TINGLING: 0
FALLS: 0
WEAKNESS: 0
MYALGIAS: 0
SPUTUM PRODUCTION: 0
HEADACHES: 0
PALPITATIONS: 0
LOSS OF CONSCIOUSNESS: 0
COUGH: 0
FEVER: 0
SORE THROAT: 1
VOMITING: 0
CHILLS: 0
DEPRESSION: 0
ABDOMINAL PAIN: 0

## 2023-09-03 ASSESSMENT — PAIN DESCRIPTION - PAIN TYPE
TYPE: ACUTE PAIN

## 2023-09-03 ASSESSMENT — LIFESTYLE VARIABLES
HAVE YOU EVER FELT YOU SHOULD CUT DOWN ON YOUR DRINKING: NO
EVER FELT BAD OR GUILTY ABOUT YOUR DRINKING: NO
ALCOHOL_USE: NO
HAVE PEOPLE ANNOYED YOU BY CRITICIZING YOUR DRINKING: NO
ON A TYPICAL DAY WHEN YOU DRINK ALCOHOL HOW MANY DRINKS DO YOU HAVE: 0
EVER HAD A DRINK FIRST THING IN THE MORNING TO STEADY YOUR NERVES TO GET RID OF A HANGOVER: NO
TOTAL SCORE: 0
CONSUMPTION TOTAL: NEGATIVE
TOTAL SCORE: 0
TOTAL SCORE: 0
HOW MANY TIMES IN THE PAST YEAR HAVE YOU HAD 5 OR MORE DRINKS IN A DAY: 0
AVERAGE NUMBER OF DAYS PER WEEK YOU HAVE A DRINK CONTAINING ALCOHOL: 0

## 2023-09-03 ASSESSMENT — FIBROSIS 4 INDEX: FIB4 SCORE: 2.88

## 2023-09-03 ASSESSMENT — PATIENT HEALTH QUESTIONNAIRE - PHQ9
2. FEELING DOWN, DEPRESSED, IRRITABLE, OR HOPELESS: NOT AT ALL
SUM OF ALL RESPONSES TO PHQ9 QUESTIONS 1 AND 2: 0
1. LITTLE INTEREST OR PLEASURE IN DOING THINGS: NOT AT ALL
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
## 2023-09-03 NOTE — PROGRESS NOTES
Received bedside report night shift RN.  Assumed pt care.   Assessment and chart check complete.  Pt is AA0X4, no signs of distress, denies nausea/vomiting.  Updated for the  POC of the day.  Maintain oxygen at 3 LPM.  Educated for IS.  Fall precautions in place, treaded socks on pt, bed in low position.   Call light within reach.   Educated pt to call if needing anything.   Hourly rounding.

## 2023-09-03 NOTE — ASSESSMENT & PLAN NOTE
Would benefit from diet exercise adjustment  Likely with sleep apnea  Continuous pulse ox monitoring

## 2023-09-03 NOTE — PROGRESS NOTES
Med rec updated and complete, per pt and [facility]  Allergies reviewed, per pt  Pt was sure the name of his inhaler and the strength of his METFORMIN.  Called [facility] @ [phone] to verify all medications.  Pt reports that he is only taking SYMBICORT 160-4.5MCG 1 puff BID, not 2 puffs BID as prescribed.   Pt reports no vitamins or OTC's in the last 30 days or longer.  Pt reports no antibiotics in the last 30 days.

## 2023-09-03 NOTE — PROGRESS NOTES
Patient admitted by Dr. Asher this morning for acute hypoxic respiratory failure due to COVID-19.  Patient seen and examined, discussed plan of care with patient and RN.  He continues to be hypoxic, requiring 3L of oxygen by nasal cannula, desaturates to the 80's off oxygen.  Continue supplemental oxygen, continue decadron, patient concerned that he may have diabetes we discussed steroid induced hyperglycemia.  Check HbA1c.

## 2023-09-03 NOTE — CARE PLAN
The patient is Stable - Low risk of patient condition declining or worsening    Shift Goals  Clinical Goals: Maintain oxygen saturation above 90%  Patient Goals: rest comfortably    Progress made toward(s) clinical / shift goals:  Maintain oxygen at 3 LPM. Encouraged deep breathing exercises and IS. Pain has been controlled throughout the shift.    Patient is not progressing towards the following goals:      Problem: Diabetes Management  Goal: Patient will achieve and maintain glucose in satisfactory range  Outcome: Progressing     Problem: Fall Risk  Goal: Patient will remain free from falls  Outcome: Progressing     Problem: Respiratory  Goal: Patient will achieve/maintain optimum respiratory ventilation and gas exchange  Outcome: Progressing     Problem: Pain - Standard  Goal: Alleviation of pain or a reduction in pain to the patient’s comfort goal  Outcome: Progressing     Problem: Respiratory  Goal: Patient will achieve/maintain optimum respiratory ventilation and gas exchange  Outcome: Progressing     Problem: Diabetes Management  Goal: Patient will achieve and maintain glucose in satisfactory range  Outcome: Progressing     Problem: Fall Risk  Goal: Patient will remain free from falls  Outcome: Progressing

## 2023-09-03 NOTE — HOSPITAL COURSE
Bolivar Art is a 48 y.o. male who presented 9/2/2023 with progressive symptoms of congestion, sore throat, and shortness of breath.  On arrival in the emergency room, his room air saturation was 86%.  Home COVID test was positive.  The patient was hospitalized for acute hypoxic respiratory failure related to acute COVID infection.

## 2023-09-03 NOTE — ED NOTES
Pt was satting 87-88% on RA, placed on oxygen at 2LPM via nasal cannula maintaining 92-94% at 2LPM

## 2023-09-03 NOTE — ASSESSMENT & PLAN NOTE
Tested positive at home as well as here  Associated with hypoxia  Improved with low-flow oxygen  Start oral Decadron

## 2023-09-03 NOTE — PROGRESS NOTES
4 Eyes Skin Assessment Completed by CHIDI Chambers and CHIDI Viramontes.    Head WDL  Ears WDL  Nose WDL  Mouth WDL  Neck WDL  Breast/Chest Scar  Shoulder Blades WDL  Spine WDL  (R) Arm/Elbow/Hand WDL  (L) Arm/Elbow/Hand WDL  Abdomen WDL  Groin WDL  Scrotum/Coccyx/Buttocks WDL  (R) Leg WDL  (L) Leg WDL  (R) Heel/Foot/Toe WDL  (L) Heel/Foot/Toe WDL          Devices In Places Nasal Cannula        Interventions In Place Pillows    Possible Skin Injury No    Pictures Uploaded Into Epic N/A  Wound Consult Placed N/A  RN Wound Prevention Protocol Ordered No

## 2023-09-03 NOTE — PROGRESS NOTES
ISOLATION PRECAUTIONS EDUCATION    Educated PATIENT, FAMILY, S.O: patient on isolation for COVID-19.    Educated on reason for isolation, how the infection may be transmitted, and how to help prevent transmission to others. Educated precautions involves staff and visitors wearing PPE, following Standard Precautions and performing meticulous hand hygiene in order to prevent transmission of infection.     Enhanced Droplet Precautions: Educated that Enhanced Droplet Precautions involves staff and visitors wearing a surgical mask when in the patient room.     In addition,  educated that they may leave their room, but prior to exiting the patient room each time, the patient needs to have on a fresh patient gown, a surgical mask must be worn by the patient while out of the patient room, and perform hand hygiene immediately prior to exiting the room.     Patient transport and mobilization on unit  Educated that they may leave their room, but prior to exiting, the patient needs to have on a fresh patient gown, ensure the potentially infectious area is covered, performing appropriate hand hygiene immediately prior to exiting the room.

## 2023-09-03 NOTE — ASSESSMENT & PLAN NOTE
Due to covid 19 virus infection  Sating 86% on RA, improved with low flow O2  Continuous pulse ox monitoring  Wean O2 as able

## 2023-09-03 NOTE — CARE PLAN
The patient is Stable - Low risk of patient condition declining or worsening    Shift Goals  Clinical Goals: No falls or injuries, rested and slept at least 4 hours, Oxygen demand improves  Patient Goals: No falls or injuries, rested and slept at least 4 hours, Oxygen demand improves    Progress made toward(s) clinical / shift goals:  No falls or injuries, rested and slept at least 4 hours, Oxygen demand stable    Patient is not progressing towards the following goals:

## 2023-09-03 NOTE — ED PROVIDER NOTES
ED Provider Note    CHIEF COMPLAINT  Chief Complaint   Patient presents with    Shortness of Breath     47 yo male ambulates to triage with reports of + covid test at home today and reports he has increased SOB.  Reports hx of lung problems.  Denies chest pain.  + sore throat and cough        EXTERNAL RECORDS REVIEWED  Outpatient Notes patient seen in the emergency department with shortness of breath on 1/13/2021.  Noted to be in SVT at that time.  Converted with 12 mg of IV adenosine.    HPI/ROS  LIMITATION TO HISTORY   Select: : None    Bolivar Art is a 48 y.o. male who presents to the emergency department for evaluation of shortness of breath and fatigue.  Symptoms started yesterday morning.  Tested positive for COVID on a home swab 2 hours ago.  Denies chest pain or pressure.  Shortness of breath more pronounced with ambulation.  Also reports nasal congestion and the feeling that he cannot breathe out of his nose.  Reports a mild sore throat as well.  Denies cough, leg swelling, fevers.  Does report chills and muscle aches.    PAST MEDICAL HISTORY   has a past medical history of Blunt chest trauma, initial encounter.    SURGICAL HISTORY   has a past surgical history that includes chest tube insertion.    FAMILY HISTORY  History reviewed. No pertinent family history.    SOCIAL HISTORY  Social History     Tobacco Use    Smoking status: Never    Smokeless tobacco: Never   Vaping Use    Vaping Use: Never used   Substance and Sexual Activity    Alcohol use: Never    Drug use: Never    Sexual activity: Not on file       CURRENT MEDICATIONS  Home Medications       Reviewed by Tiki Holt R.N. (Registered Nurse) on 09/02/23 at 2204  Med List Status: Not Addressed     Medication Last Dose Status   CALCIUM PO  Active   chlorhexidine (PERIDEX) 0.12 % Solution  Active   Cholecalciferol (D3 PO)  Active   ZINC SULFATE PO  Active                    ALLERGIES  No Known Allergies    PHYSICAL EXAM  VITAL SIGNS: /83    "Pulse 81   Temp 37 °C (98.6 °F) (Temporal)   Resp (!) 22   Ht 1.702 m (5' 7\")   Wt (!) 136 kg (300 lb 4.3 oz)   SpO2 95%   BMI 47.03 kg/m²    Constitutional: No acute distress  HEENT: Atraumatic, normocephalic, pupils are equal round reactive to light,  nasal congestion noted, mouth shows moist mucous membranes  Neck: Supple, no JVD, no tracheal deviation  Cardiovascular: Regular rate and rhythm, no murmur, rub or gallop, 2+ pulses peripherally x4  Thorax & Lungs: No respiratory distress, no wheezes, rales or rhonchi, no chest wall tenderness.  GI: Soft, non-distended, non-tender, no rebound  Skin: Warm, dry, no acute rash or lesion  Musculoskeletal: Moving all extremities, no acute deformity, no edema, no tenderness  Neurologic: A&Ox3, at baseline mentation, cranial nerves II through XII are grossly intact, no sensory deficit, no ataxia  Psychiatric: Appropriate affect for situation at this time      DIAGNOSTIC STUDIES / PROCEDURES  EKG  I have independently interpreted this EKG  Results for orders placed or performed during the hospital encounter of 23   EKG   Result Value Ref Range    Report       [facility] Emergency Dept.    Test Date:  2023  Pt Name:    BUBBA GALLARDO                Department: [facility]  MRN:        [id number]                      Room:       -ROOM 3  Gender:     Male                         Technician: JOHN  :        1975                   Requested By:PIERRE PIÑA  Order #:    [id number]                    Reading MD: Pierre Piña    Measurements  Intervals                                Axis  Rate:       75                           P:          16  AZ:         170                          QRS:        108  QRSD:       99                           T:          63  QT:         385  QTc:        430    Interpretive Statements  EKG interpretation: Normal sinus rhythm at a rate of 75, left posterior  fascicular block, normal intervals, half " millimeter of ST elevation in aVF.  No ST depression.  No T wave inversion.  No change from prior EKG.  Impression nonspecific ST segment otherwise nonischemic EKG.  Electronically Signed O n 09- 22:57:07 PDT by Chris Aguirre           LABS  Labs Reviewed   CBC WITH DIFFERENTIAL - Abnormal; Notable for the following components:       Result Value    MCV 98.4 (*)     MCHC 32.1 (*)     RDW 51.1 (*)     All other components within normal limits   COMP METABOLIC PANEL - Abnormal; Notable for the following components:    Glucose 126 (*)     All other components within normal limits   COV-2, FLU A/B, AND RSV BY PCR (PhishMeID) - Abnormal; Notable for the following components:    SARS-CoV-2 by PCR DETECTED (*)     All other components within normal limits    Narrative:     Release to patient->Immediate   ESTIMATED GFR         RADIOLOGY  I have independently interpreted the diagnostic imaging associated with this visit and am waiting the final reading from the radiologist.   My preliminary interpretation is as follows: X-ray demonstrates mild scattered opacities.    Radiologist interpretation:   DX-CHEST-PORTABLE (1 VIEW)   Final Result         Mild peripheral interstitial prominence could relate to Covid 19.               COURSE & MEDICAL DECISION MAKING    ED Observation Status? Yes; I am placing the patient in to an observation status due to a diagnostic uncertainty as well as therapeutic intensity. Patient placed in observation status at 10:26 PM, 9/2/2023.     Observation plan is as follows: Chest x-ray, labs and observation on pulse oximetry to assess for any hypoxia    Upon Reevaluation, the patient's condition has: not improved; and will be escalated to hospitalization.    Patient discharged from ED Observation status at 12:05 AM (Time) 9/3/2023 (Date).     INITIAL ASSESSMENT, COURSE AND PLAN  Care Narrative:     Patient arrives to the emergency department for evaluation of dyspnea on exertion in the setting  of COVID positivity 2 hours ago.  Notably slightly tachypneic though does appear slightly anxious.  Otherwise is not tachycardic and is hemodynamically stable, afebrile.  Oxygen saturation of 90 to 92% both at rest and with 2 minutes of ambulation.  Is quite obese which predisposes him to more serious course.  No serious medical comorbidities.  Plan for chest x-ray to assess for pneumonitis, pneumonia, edema and basic labs to assess for leukopenia, electrolyte abnormality, kidney injury.  Ambulated in the room for 2 minutes with persistent saturation of 90 to 92% and no profound tachycardia.  We will continue to monitor on pulse oximetry while at rest to assess for hypoxia.    On reassessment patient transiently hypoxic down to 86-88% while awake.  Remains tachypneic.  Placed on 2 L of oxygen.  EKG nonischemic.  Awaiting chest x-ray and labs.    Chest x-ray with scattered mild opacities.  Suspect this is related to developing viral pneumonitis.  Will obtain formal COVID-19 swab.  Awaiting labs.    On reassessment patient with persistent desaturations to 86% on room air while awake and talking.  Desaturation further to low 80s while sleeping.  Suspect contaminant sleep apnea but given his persistent hypoxia in the setting of COVID positivity and multifocal infection on chest x-ray we will plan for admission to the hospital for continued observation.  Will provide 6 mg of IV Decadron.  Will discuss case with admitting hospitalist.    Discussed case with Dr. Asher, hospitalist.  He will admit the patient to the hospital.  I appreciate his assistance in the care of the patient.      ADDITIONAL PROBLEM LIST  Obstructive sleep apnea    DISPOSITION AND DISCUSSIONS  I have discussed management of the patient with the following physicians and TYLER's: Dr. Asher, hospitalist    Discussion of management with other [facility] or appropriate source(s): None       FINAL DIAGNOSIS  1. Hypoxia    2. Viral pneumonitis    3. COVID-19            Electronically signed by: Chris Aguirre M.D., 9/2/2023 10:13 PM

## 2023-09-03 NOTE — ED TRIAGE NOTES
"Chief Complaint   Patient presents with    Shortness of Breath     47 yo male ambulates to triage with reports of + covid test at home today and reports he has increased SOB.  Reports hx of lung problems.  Denies chest pain.  + sore throat and cough     /83   Pulse 86   Temp 37 °C (98.6 °F) (Temporal)   Resp (!) 22   Ht 1.702 m (5' 7\")   Wt (!) 136 kg (300 lb 4.3 oz)   SpO2 90%   BMI 47.03 kg/m²    "

## 2023-09-04 ENCOUNTER — PHARMACY VISIT (OUTPATIENT)
Dept: PHARMACY | Facility: MEDICAL CENTER | Age: 48
End: 2023-09-04
Payer: MEDICARE

## 2023-09-04 VITALS
RESPIRATION RATE: 20 BRPM | HEART RATE: 65 BPM | TEMPERATURE: 97.5 F | OXYGEN SATURATION: 94 % | DIASTOLIC BLOOD PRESSURE: 64 MMHG | HEIGHT: 67 IN | BODY MASS INDEX: 47.82 KG/M2 | WEIGHT: 304.68 LBS | SYSTOLIC BLOOD PRESSURE: 132 MMHG

## 2023-09-04 LAB
ANION GAP SERPL CALC-SCNC: 12 MMOL/L (ref 7–16)
BUN SERPL-MCNC: 13 MG/DL (ref 8–22)
CALCIUM SERPL-MCNC: 9.2 MG/DL (ref 8.4–10.2)
CHLORIDE SERPL-SCNC: 99 MMOL/L (ref 96–112)
CO2 SERPL-SCNC: 23 MMOL/L (ref 20–33)
CREAT SERPL-MCNC: 0.74 MG/DL (ref 0.5–1.4)
ERYTHROCYTE [DISTWIDTH] IN BLOOD BY AUTOMATED COUNT: 49.3 FL (ref 35.9–50)
GFR SERPLBLD CREATININE-BSD FMLA CKD-EPI: 112 ML/MIN/1.73 M 2
GLUCOSE BLD STRIP.AUTO-MCNC: 148 MG/DL (ref 65–99)
GLUCOSE BLD STRIP.AUTO-MCNC: 178 MG/DL (ref 65–99)
GLUCOSE SERPL-MCNC: 280 MG/DL (ref 65–99)
HCT VFR BLD AUTO: 46.9 % (ref 42–52)
HGB BLD-MCNC: 15.4 G/DL (ref 14–18)
MCH RBC QN AUTO: 31.7 PG (ref 27–33)
MCHC RBC AUTO-ENTMCNC: 32.8 G/DL (ref 32.3–36.5)
MCV RBC AUTO: 96.5 FL (ref 81.4–97.8)
PLATELET # BLD AUTO: 284 K/UL (ref 164–446)
PMV BLD AUTO: 10.2 FL (ref 9–12.9)
POTASSIUM SERPL-SCNC: 4.2 MMOL/L (ref 3.6–5.5)
RBC # BLD AUTO: 4.86 M/UL (ref 4.7–6.1)
SODIUM SERPL-SCNC: 134 MMOL/L (ref 135–145)
WBC # BLD AUTO: 8.8 K/UL (ref 4.8–10.8)

## 2023-09-04 PROCEDURE — 80048 BASIC METABOLIC PNL TOTAL CA: CPT

## 2023-09-04 PROCEDURE — 36415 COLL VENOUS BLD VENIPUNCTURE: CPT

## 2023-09-04 PROCEDURE — 94760 N-INVAS EAR/PLS OXIMETRY 1: CPT

## 2023-09-04 PROCEDURE — RXMED WILLOW AMBULATORY MEDICATION CHARGE: Performed by: HOSPITALIST

## 2023-09-04 PROCEDURE — A9270 NON-COVERED ITEM OR SERVICE: HCPCS | Performed by: INTERNAL MEDICINE

## 2023-09-04 PROCEDURE — 82962 GLUCOSE BLOOD TEST: CPT

## 2023-09-04 PROCEDURE — 700102 HCHG RX REV CODE 250 W/ 637 OVERRIDE(OP): Performed by: INTERNAL MEDICINE

## 2023-09-04 PROCEDURE — 99239 HOSP IP/OBS DSCHRG MGMT >30: CPT | Performed by: HOSPITALIST

## 2023-09-04 PROCEDURE — 85027 COMPLETE CBC AUTOMATED: CPT

## 2023-09-04 RX ORDER — DEXAMETHASONE 6 MG/1
6 TABLET ORAL DAILY
Qty: 8 TABLET | Refills: 0 | Status: SHIPPED | OUTPATIENT
Start: 2023-09-04

## 2023-09-04 RX ADMIN — INSULIN HUMAN 1 UNITS: 100 INJECTION, SOLUTION PARENTERAL at 05:58

## 2023-09-04 RX ADMIN — SENNOSIDES AND DOCUSATE SODIUM 2 TABLET: 50; 8.6 TABLET ORAL at 05:52

## 2023-09-04 RX ADMIN — DEXAMETHASONE 6 MG: 4 TABLET ORAL at 05:52

## 2023-09-04 ASSESSMENT — PAIN DESCRIPTION - PAIN TYPE: TYPE: ACUTE PAIN

## 2023-09-04 NOTE — DISCHARGE PLANNING
Case Management Discharge Planning    Admission Date: 9/2/2023  GMLOS: 5.2  ALOS: 1    6-Clicks ADL Score: 24  6-Clicks Mobility Score: 24      Anticipated Discharge Dispo: Discharge Disposition: Discharged to home/self care (01)    DME Needed: Yes    DME Ordered: Yes    Action(s) Taken: Updated Provider/Nurse on Discharge Plan  Patient discussed during morning IDT rounds with team. Patient is medically cleared for discharge at this time.  SWCM spoke  with patient at bedside via phone, introduced self, explained role and reason for call. Patient states he lives in  [facility] home (address listed on face sheet) for the past six month and can return there when he is medically cleared. The home's director- Abel Rogel will be his mode of transportation upon discharge. CM acknowledging home oxygen order and verbal choice is obtained from patient for continuity of care.  [facility] placed call to Nickolas Brown p) [phone] . No answer. Left message for call back.    [facility] will continue to follow up for safe disposition.    Escalations Completed: None    Medically Clear: Yes    Next Steps: pending home oxygen arrangements    Barriers to Discharge: None    Is the patient up for discharge tomorrow: No

## 2023-09-04 NOTE — PROGRESS NOTES
Received bedside report from night shift  Assumed pt care.   Assessment and chart check complete.  Pt is AA0X4, no signs of distress, denies nausea/vomiting.  Updated for the  POC of the day.  Maintain oxygen at 3 LPM.  Fall precautions in place, treaded socks on pt, bed in low position.   Call light within reach.   Educated pt to call if needing anything.   Hourly rounding.

## 2023-09-04 NOTE — FACE TO FACE
"Face to Face Note  -  Durable Medical Equipment    Donn Batres M.D. - NPI: [id number]  I certify that this patient is under my care and that they had a durable medical equipment(DME)face to face encounter by myself that meets the physician DME face-to-face encounter requirements with this patient on:    Date of encounter:   Patient:                    MRN:                       YOB: 2022  Bolivar Art  [id number]  1975     The encounter with the patient was in whole, or in part, for the following medical condition, which is the primary reason for durable medical equipment:  Covid-19 Infection    I certify that, based on my findings, the following durable medical equipment is medically necessary:    Oxygen   HOME O2 Saturation Measurements:(Values must be present for Home Oxygen orders)  Room air sat at rest: 87 (86% sleeping)  Room air sat with amb: 88  With liters of O2: 3, O2 sat at rest with O2: 92  With Liters of O2: 3, O2 sat with amb with O2 : 91  Is the patient mobile?: Yes  If patient feels more short of breath, they can go up to 6 liters per minute and contact healthcare provider.    Supporting Symptoms: The patient requires supplemental oxygen, as the following interventions have been tried with limited or no improvement: \"Oral and/or IV steroids.    My Clinical findings support the need for the above equipment due to:  Hypoxia  "

## 2023-09-04 NOTE — CARE PLAN
The patient is Stable - Low risk of patient condition declining or worsening    Shift Goals  Clinical Goals: Maintain safety, accucheck done, maintain SpO2 above 90%  Patient Goals: Food    Progress made toward(s) clinical / shift goals:  Safety maintained, Accucheck done, SpO2 maintained above 90% overnight. Food provided per pt request this shift.    Patient is not progressing towards the following goals: N/a

## 2023-09-04 NOTE — DIETARY
NUTRITION SERVICES: BMI - Pt with BMI >40 (=Body mass index is 47.72 kg/m².), Class III obesity. Weight loss counseling not appropriate in acute care setting. RECOMMEND - If appropriate at DC please refer to outpatient nutrition services for weight management.

## 2023-09-04 NOTE — DISCHARGE SUMMARY
Discharge Summary    CHIEF COMPLAINT ON ADMISSION  Chief Complaint   Patient presents with    Shortness of Breath     49 yo male ambulates to triage with reports of + covid test at home today and reports he has increased SOB.  Reports hx of lung problems.  Denies chest pain.  + sore throat and cough        Reason for Admission  Covid+; Shortness of Breath     Admission Date  9/2/2023    CODE STATUS  Full Code    HPI & HOSPITAL COURSE  This is a 48 y.o. male here with shortness of breath and congestion.    Bolivar Art is a 48 y.o. male who presented 9/2/2023 with progressive symptoms of congestion, sore throat, and shortness of breath.  On arrival in the emergency room, his room air saturation was 86%.  Home COVID test was positive.  The patient was hospitalized for acute hypoxic respiratory failure related to acute COVID infection.    Patient's symptoms have improved steadily through his hospital stay, his cough and congestion are significantly better than they were on admission.  He can be discharged from the hospital today, he has demonstrated need for ongoing home oxygen and this will be arranged.  In addition the patient will be treated with steroids and I have written a prescription for Paxlovid.  I have also written a prescription to increase the patient's metformin to maximum dose .  Follow-up with primary care provider    Therefore, he is discharged in fair and stable condition to home with close outpatient follow-up.    The patient recovered much more quickly than anticipated on admission.    Discharge Date  9/4/2023    FOLLOW UP ITEMS POST DISCHARGE  Follow-up with primary care provider    DISCHARGE DIAGNOSES  Principal Problem:    Acute respiratory failure with hypoxia (HCC) (POA: Yes)  Active Problems:    Obesity (POA: Yes)    Hyperglycemia (POA: Yes)    COVID-19 virus infection (POA: Yes)  Resolved Problems:    * No resolved hospital problems. *      FOLLOW UP  No future appointments.  No follow-up  provider specified.    MEDICATIONS ON DISCHARGE     Medication List        START taking these medications        Instructions   dexamethasone 6 MG Tabs  Commonly known as: Decadron   Take 1 Tablet by mouth every day.  Dose: 6 mg     Nirmatrelvir&Ritonavir 300/100 20 x 150 MG & 10 x 100MG Tbpk   As per packet instructions            CHANGE how you take these medications        Instructions   metformin 1000 MG tablet  What changed:   medication strength  how much to take  Commonly known as: Glucophage   Take 1 Tablet by mouth 2 times a day with meals.  Dose: 1,000 mg            CONTINUE taking these medications        Instructions   budesonide-formoterol 160-4.5 MCG/ACT Aero  Commonly known as: Symbicort   Inhale 1 Puff 2 times a day.  Dose: 1 Puff              Allergies  No Known Allergies    DIET  Orders Placed This Encounter   Procedures    Diet Order Diet: Consistent CHO (Diabetic)     Standing Status:   Standing     Number of Occurrences:   1     Order Specific Question:   Diet:     Answer:   Consistent CHO (Diabetic) [4]       ACTIVITY  As tolerated.  Weight bearing as tolerated    CONSULTATIONS  None     PROCEDURES  Chest x-ray 9/2/2023:  Mild peripheral interstitial prominence could relate to Covid 19.       LABORATORY  Lab Results   Component Value Date    SODIUM 134 (L) 09/04/2023    POTASSIUM 4.2 09/04/2023    CHLORIDE 99 09/04/2023    CO2 23 09/04/2023    GLUCOSE 280 (H) 09/04/2023    BUN 13 09/04/2023    CREATININE 0.74 09/04/2023        Lab Results   Component Value Date    WBC 8.8 09/04/2023    HEMOGLOBIN 15.4 09/04/2023    HEMATOCRIT 46.9 09/04/2023    PLATELETCT 284 09/04/2023        Total time of the discharge process exceeds 48 minutes.

## 2023-09-04 NOTE — DISCHARGE PLANNING
@2723  Agency/Facility Name: [facility]  Outcome: DPA left a voice message for [facility].  Awaiting a call back.    @3690  Agency/Facility Name: [facility]  Spoke To: [facility]  Outcome: Referral accepted and O2 has been delivered bedside.    JENNIFER Rodriguez notified via Teams.

## 2023-09-04 NOTE — DISCHARGE PLANNING
Received choice form at: 1154  Agency/Facility name: [facility]  Referral sent per choice form at:  1214

## 2023-09-04 NOTE — CARE PLAN
The patient is Stable - Low risk of patient condition declining or worsening    Shift Goals  Clinical Goals: Pt able to wean off oxygen, maintain oxygen above 90%  Patient Goals: for discharge    Progress made toward(s) clinical / shift goals:  Oxygen maintain at 3 LPM, oxygen saturation at 93%. Pain has been controlled throughout the shift.    Patient is not progressing towards the following goals:      Problem: Respiratory  Goal: Patient will achieve/maintain optimum respiratory ventilation and gas exchange  Outcome: Progressing     Problem: Diabetes Management  Goal: Patient will achieve and maintain glucose in satisfactory range  Outcome: Progressing     Problem: Fall Risk  Goal: Patient will remain free from falls  Outcome: Progressing     Problem: Discharge Barriers/Planning  Goal: Patient's continuum of care needs are met  Outcome: Progressing  Flowsheets (Taken 9/4/2023 8868)  Continuum of Care Needs:   Assessed for discharge barriers   Communicated discharge barriers to interdisciplinary tream   Involved patient/family/support system in discharge process   Collaborated with Case Management/   Provided and explained discharge instructions

## 2023-09-04 NOTE — PROGRESS NOTES
Received bedside report from day shift RN at 19:15.   Assumed pt care. Pt seen AO4, up in chair.   O2 at 3L via NC.  No pain and nausea reported at this time.   POC and goals discussed.  Isolation precautions, safety and fall precautions in place. Bed locked and lowest position.  Instructed pt to use call light, verbalized understanding.   Call light kept within reach.  No other needs reported at this time.   Implementation of POC in progress.

## 2023-09-04 NOTE — PROGRESS NOTES
Discharging patient home per MD order. Pt demonstrated understanding of discharge instructions, follow up appointments, home medications, prescriptions, and home care for oxygen. Pt is voiding without difficulty, pain well controlled, tolerating oral medications, O2 greater than 90%.Pt verbalized understanding of discharge instructions and educational handouts and all questions answered. PIV removed. Home oxygen and meds to beds delivered at bedside. Pt discharged off unit with hospital escort.

## 2023-09-05 ENCOUNTER — PATIENT OUTREACH (OUTPATIENT)
Dept: SCHEDULING | Facility: IMAGING CENTER | Age: 48
End: 2023-09-05
Payer: COMMERCIAL

## 2023-09-06 ENCOUNTER — HOSPITAL ENCOUNTER (EMERGENCY)
Facility: MEDICAL CENTER | Age: 48
End: 2023-09-06
Attending: EMERGENCY MEDICINE
Payer: COMMERCIAL

## 2023-09-06 ENCOUNTER — APPOINTMENT (OUTPATIENT)
Dept: RADIOLOGY | Facility: MEDICAL CENTER | Age: 48
End: 2023-09-06
Attending: EMERGENCY MEDICINE
Payer: COMMERCIAL

## 2023-09-06 VITALS
SYSTOLIC BLOOD PRESSURE: 120 MMHG | HEIGHT: 67 IN | TEMPERATURE: 98 F | WEIGHT: 299.16 LBS | DIASTOLIC BLOOD PRESSURE: 78 MMHG | RESPIRATION RATE: 20 BRPM | OXYGEN SATURATION: 95 % | HEART RATE: 65 BPM | BODY MASS INDEX: 46.96 KG/M2

## 2023-09-06 DIAGNOSIS — U07.1 COVID-19 VIRUS INFECTION: ICD-10-CM

## 2023-09-06 DIAGNOSIS — R09.02 HYPOXIA: ICD-10-CM

## 2023-09-06 PROCEDURE — A9270 NON-COVERED ITEM OR SERVICE: HCPCS | Performed by: EMERGENCY MEDICINE

## 2023-09-06 PROCEDURE — 71045 X-RAY EXAM CHEST 1 VIEW: CPT

## 2023-09-06 PROCEDURE — 99284 EMERGENCY DEPT VISIT MOD MDM: CPT

## 2023-09-06 PROCEDURE — 700102 HCHG RX REV CODE 250 W/ 637 OVERRIDE(OP): Performed by: EMERGENCY MEDICINE

## 2023-09-06 RX ORDER — ACETAMINOPHEN 325 MG/1
1625 TABLET ORAL EVERY 6 HOURS PRN
COMMUNITY

## 2023-09-06 RX ORDER — DEXAMETHASONE 4 MG/1
6 TABLET ORAL ONCE
Status: COMPLETED | OUTPATIENT
Start: 2023-09-06 | End: 2023-09-06

## 2023-09-06 RX ADMIN — DEXAMETHASONE 6 MG: 4 TABLET ORAL at 08:24

## 2023-09-06 ASSESSMENT — FIBROSIS 4 INDEX: FIB4 SCORE: 2.34

## 2023-09-06 NOTE — ED NOTES
Med rec updated and complete, per pt   Allergies reviewed, per pt  Pt reports that he has not used his ALBUTEROL inhaler that was prescribed on on 8/3/2023 in the last 30 days or longer.

## 2023-09-06 NOTE — ED TRIAGE NOTES
"Chief Complaint   Patient presents with    Shortness of Breath     Patient reports positive for covid, discharged from hospital, feels like \"cant catch my breath, since they took my oxygen away\".      BP (!) 136/91   Pulse 74   Temp 36.3 °C (97.4 °F) (Temporal)   Resp (!) 22   Ht 1.702 m (5' 7\")   Wt (!) 136 kg (299 lb 2.6 oz)   SpO2 93%   BMI 46.86 kg/m²     "

## 2023-09-06 NOTE — ED PROVIDER NOTES
"ED Provider Note    CHIEF COMPLAINT  Chief Complaint   Patient presents with    Shortness of Breath     Patient reports positive for covid, discharged from hospital, feels like \"cant catch my breath, since they took my oxygen away\".        EXTERNAL RECORDS REVIEWED  Inpatient Notes discharge summary 9/4/2023 after admission for shortness of breath and positive home COVID test.  Progressive symptoms of congestion, sore throat and shortness of breath over the 2 days prior.  Hypoxic with room air 86% on arrival.  Steadily improved during hospital stay.  Arrange for home oxygen, as well as prescription for Paxlovid.    HPI/ROS  LIMITATION TO HISTORY   Select: : None  OUTSIDE HISTORIAN(S):  none    Bolivar Art is a 48 y.o. male who presents to the emergency department through triage requesting help with home oxygen.  Patient states he was admitted earlier this week with COVID, discharged the day prior with home oxygen but states he received a phone call from the oxygen company that said his insurance was no longer going to cover the oxygen it was going to be picked up.  He is concerned that if he does not have this as prescribed he will not do well.  He has been compliant with the Paxlovid.  He is comfortable on home oxygen.  No increasing shortness of breath, no fevers.  Tolerating food and fluids.  Denies complaints otherwise.    PAST MEDICAL HISTORY   has a past medical history of Blunt chest trauma, initial encounter.    SURGICAL HISTORY   has a past surgical history that includes chest tube insertion.    FAMILY HISTORY  History reviewed. No pertinent family history.    SOCIAL HISTORY  Social History     Tobacco Use    Smoking status: Never    Smokeless tobacco: Never   Vaping Use    Vaping Use: Never used   Substance and Sexual Activity    Alcohol use: Never    Drug use: Never    Sexual activity: Not on file       CURRENT MEDICATIONS  Home Medications       Reviewed by Cyndi Nicholas R.N. (Registered Nurse) on " "09/06/23 at 0654  Med List Status: Not Addressed     Medication Last Dose Status   budesonide-formoterol (SYMBICORT) 160-4.5 MCG/ACT Aerosol  Active   dexamethasone (DECADRON) 6 MG Tab  Active   metformin (GLUCOPHAGE) 1000 MG tablet  Active   Nirmatrelvir&Ritonavir 300/100 20 x 150 MG & 10 x 100MG Tablet Therapy Pack  Active                    ALLERGIES  No Known Allergies    PHYSICAL EXAM  VITAL SIGNS: BP (!) 136/91   Pulse 74   Temp 36.3 °C (97.4 °F) (Temporal)   Resp (!) 22   Ht 1.702 m (5' 7\")   Wt (!) 136 kg (299 lb 2.6 oz)   SpO2 93%   BMI 46.86 kg/m²    Pulse ox interpretation: I interpret this pulse ox as normal.  Constitutional: Alert in no apparent distress.  Obese  HENT: Normocephalic, atraumatic. Bilateral external ears normal, Nose normal.   Eyes: Pupils are equal and reactive, Conjunctiva normal.   Neck: Normal range of motion, Supple  Lymphatic: No lymphadenopathy noted.   Cardiovascular: Regular rate and rhythm, no murmurs. Distal pulses intact.  No peripheral edema.  Thorax & Lungs: Normal breath sounds.  No wheezing/rales/ronchi.  Mild tachypnea with out ncreased work of breathing, clipped speech or retractions.   Skin: Warm, Dry, No erythema, No rash.   Musculoskeletal: Good range of motion in all major joints.   Neurologic: Alert , and orient x4.  Moves 4 extremity spontaneously.  Psychiatric: Affect normal, Judgment normal, Mood normal.       COURSE & MEDICAL DECISION MAKING    ED Observation Status? Yes; I am placing the patient in to an observation status due to a diagnostic uncertainty as well as therapeutic intensity. Patient placed in observation status at 6:54 AM, 9/6/2023.     Observation plan is as follows: Observation while home oxygen as determined    Upon Reevaluation, the patient's condition has: Stable for discharge home to continue Paxlovid and use of home oxygen    Patient discharged from ED Observation status at 0956 (Time) 9/6/23 (Date).     INITIAL ASSESSMENT, COURSE AND " PLAN  Care Narrative:   Patient is comfortable, hemodynamically stable without increased work of breathing or hypoxia on small amount of supplemental oxygen with history of COVID, compliant with Paxlovid since discharge 2 days ago.     is aware that patient is concerned that he received a phone call that his home oxygen will be picked up due to insurance discrepancy.     has confirmed that patient's insurance covers his home oxygen, she has spoken with the home oxygen company who states they have not contacted the patient and his oxygen will not be removed or revoked.    ADDITIONAL PROBLEM LIST    DISPOSITION AND DISCUSSIONS  The patient is stable for discharge home, anticipatory guidance provided, continue home oxygen medications as previously indicated, close follow-up is encouraged and strict return instructions have been discussed. Patient is agreeable to the disposition and plan.      FINAL DIAGNOSIS  1. COVID-19 virus infection    2. Hypoxia           Electronically signed by: Tiki Alford D.O., 9/6/2023 7:25 AM

## 2023-09-06 NOTE — DISCHARGE PLANNING
Anticipated Discharge Disposition: Home with O2    Action: Reviewed with Dr Alford. Pt was discharge home on 9.4.23 with oxygen from [facility]  and now in ER as he was was advised that the [employer] company is coming to take O2 away and is concerned. ER CM request that o2 sats be done and o2 he is on be documented please by RN. Call placed to [facility] to review concerns? Spoke with Mechelle  she notes that he is still on service no equipment removed. They have not removed any equipment.  They have not contacted him  and no orders to  equipment. No notes that regard.    Barriers to Discharge: Oxygen. [facility]. 9.2.23 Covid positive    Plan: Will cont to follow.

## 2023-09-06 NOTE — ED NOTES
Pt requesting to speak with ERP, ERP aware.  [employer] phone provided so patient can arrange a ride home.

## 2023-09-06 NOTE — DISCHARGE INSTRUCTIONS
Follow-up with primary care this week for reevaluation, medication management.    Continue home medications as previously indicated and provided on discharge earlier this week.  Continue home oxygen as previously indicated during this COVID-19 virus infection.    Encourage oral fluid hydration and balanced diet.  Activity as tolerated.    Return to the emergency department for intractable fever, altered mental status, difficulty breathing/wheezing/retractions, increased oxygen needs, vomiting or other new concerns.